# Patient Record
Sex: FEMALE | Race: WHITE | Employment: OTHER | URBAN - METROPOLITAN AREA
[De-identification: names, ages, dates, MRNs, and addresses within clinical notes are randomized per-mention and may not be internally consistent; named-entity substitution may affect disease eponyms.]

---

## 2017-01-01 ENCOUNTER — APPOINTMENT (OUTPATIENT)
Dept: GENERAL RADIOLOGY | Age: 82
DRG: 871 | End: 2017-01-01
Attending: INTERNAL MEDICINE
Payer: MEDICARE

## 2017-01-01 ENCOUNTER — HOME CARE VISIT (OUTPATIENT)
Dept: HOSPICE | Facility: HOSPICE | Age: 82
End: 2017-01-01
Payer: MEDICARE

## 2017-01-01 ENCOUNTER — HOSPITAL ENCOUNTER (OUTPATIENT)
Dept: INFUSION THERAPY | Age: 82
Discharge: HOME | End: 2017-02-09
Payer: MEDICARE

## 2017-01-01 ENCOUNTER — APPOINTMENT (OUTPATIENT)
Dept: ULTRASOUND IMAGING | Age: 82
DRG: 871 | End: 2017-01-01
Attending: INTERNAL MEDICINE
Payer: MEDICARE

## 2017-01-01 ENCOUNTER — HOSPITAL ENCOUNTER (OUTPATIENT)
Dept: LAB | Age: 82
Discharge: HOME | End: 2017-02-08

## 2017-01-01 ENCOUNTER — HOSPITAL ENCOUNTER (OUTPATIENT)
Dept: INFUSION THERAPY | Age: 82
Discharge: HOME | End: 2017-03-01
Payer: MEDICARE

## 2017-01-01 ENCOUNTER — HOSPITAL ENCOUNTER (INPATIENT)
Age: 82
LOS: 2 days | DRG: 835 | End: 2017-03-24
Attending: INTERNAL MEDICINE | Admitting: INTERNAL MEDICINE
Payer: OTHER MISCELLANEOUS

## 2017-01-01 ENCOUNTER — HOSPICE ADMISSION (OUTPATIENT)
Dept: HOSPICE | Facility: HOSPICE | Age: 82
End: 2017-01-01
Payer: MEDICARE

## 2017-01-01 ENCOUNTER — HOSPITAL ENCOUNTER (OUTPATIENT)
Dept: LAB | Age: 82
Discharge: HOME | End: 2017-03-13

## 2017-01-01 ENCOUNTER — APPOINTMENT (OUTPATIENT)
Dept: GENERAL RADIOLOGY | Age: 82
DRG: 871 | End: 2017-01-01
Attending: NURSE PRACTITIONER
Payer: MEDICARE

## 2017-01-01 ENCOUNTER — HOSPITAL ENCOUNTER (OUTPATIENT)
Dept: LAB | Age: 82
Discharge: HOME | End: 2017-03-07

## 2017-01-01 ENCOUNTER — HOSPITAL ENCOUNTER (INPATIENT)
Age: 82
LOS: 5 days | Discharge: HOSPICE | DRG: 871 | End: 2017-03-22
Attending: INTERNAL MEDICINE | Admitting: INTERNAL MEDICINE
Payer: MEDICARE

## 2017-01-01 ENCOUNTER — HOSPITAL ENCOUNTER (OUTPATIENT)
Dept: INFUSION THERAPY | Age: 82
Discharge: HOME | End: 2017-03-10
Payer: MEDICARE

## 2017-01-01 ENCOUNTER — HOSPITAL ENCOUNTER (OUTPATIENT)
Dept: INFUSION THERAPY | Age: 82
Discharge: HOME | End: 2017-03-16
Payer: MEDICARE

## 2017-01-01 ENCOUNTER — APPOINTMENT (OUTPATIENT)
Dept: CT IMAGING | Age: 82
DRG: 871 | End: 2017-01-01
Attending: NURSE PRACTITIONER
Payer: MEDICARE

## 2017-01-01 ENCOUNTER — HOSPITAL ENCOUNTER (OUTPATIENT)
Dept: LAB | Age: 82
Discharge: HOME | End: 2017-02-27

## 2017-01-01 VITALS
HEART RATE: 74 BPM | SYSTOLIC BLOOD PRESSURE: 152 MMHG | RESPIRATION RATE: 18 BRPM | TEMPERATURE: 97 F | DIASTOLIC BLOOD PRESSURE: 80 MMHG | OXYGEN SATURATION: 99 %

## 2017-01-01 VITALS
SYSTOLIC BLOOD PRESSURE: 132 MMHG | HEART RATE: 75 BPM | DIASTOLIC BLOOD PRESSURE: 68 MMHG | OXYGEN SATURATION: 98 % | TEMPERATURE: 98 F | RESPIRATION RATE: 16 BRPM

## 2017-01-01 VITALS
RESPIRATION RATE: 18 BRPM | TEMPERATURE: 98.2 F | HEART RATE: 73 BPM | SYSTOLIC BLOOD PRESSURE: 144 MMHG | DIASTOLIC BLOOD PRESSURE: 77 MMHG

## 2017-01-01 VITALS
TEMPERATURE: 99.2 F | DIASTOLIC BLOOD PRESSURE: 77 MMHG | RESPIRATION RATE: 18 BRPM | SYSTOLIC BLOOD PRESSURE: 166 MMHG | HEART RATE: 83 BPM

## 2017-01-01 VITALS
WEIGHT: 152.34 LBS | HEART RATE: 82 BPM | OXYGEN SATURATION: 95 % | DIASTOLIC BLOOD PRESSURE: 62 MMHG | TEMPERATURE: 97.1 F | BODY MASS INDEX: 26.99 KG/M2 | RESPIRATION RATE: 17 BRPM | SYSTOLIC BLOOD PRESSURE: 149 MMHG | HEIGHT: 63 IN

## 2017-01-01 VITALS
SYSTOLIC BLOOD PRESSURE: 89 MMHG | DIASTOLIC BLOOD PRESSURE: 49 MMHG | HEART RATE: 88 BPM | RESPIRATION RATE: 10 BRPM | OXYGEN SATURATION: 83 % | TEMPERATURE: 98 F

## 2017-01-01 LAB
ABO + RH BLD: NORMAL
ALBUMIN SERPL BCP-MCNC: 1.8 G/DL (ref 3.5–5)
ALBUMIN SERPL BCP-MCNC: 1.9 G/DL (ref 3.5–5)
ALBUMIN SERPL BCP-MCNC: 2.1 G/DL (ref 3.5–5)
ALBUMIN SERPL BCP-MCNC: 2.5 G/DL (ref 3.5–5)
ALBUMIN SERPL BCP-MCNC: 3 G/DL (ref 3.5–5)
ALBUMIN/GLOB SERPL: 0.5 {RATIO} (ref 1.1–2.2)
ALBUMIN/GLOB SERPL: 0.6 {RATIO} (ref 1.1–2.2)
ALBUMIN/GLOB SERPL: 0.7 {RATIO} (ref 1.1–2.2)
ALBUMIN/GLOB SERPL: 0.7 {RATIO} (ref 1.1–2.2)
ALBUMIN/GLOB SERPL: 0.8 {RATIO} (ref 1.1–2.2)
ALP SERPL-CCNC: 116 U/L (ref 45–117)
ALP SERPL-CCNC: 82 U/L (ref 45–117)
ALP SERPL-CCNC: 87 U/L (ref 45–117)
ALP SERPL-CCNC: 90 U/L (ref 45–117)
ALP SERPL-CCNC: 98 U/L (ref 45–117)
ALT SERPL-CCNC: 14 U/L (ref 12–78)
ALT SERPL-CCNC: 15 U/L (ref 12–78)
ALT SERPL-CCNC: 20 U/L (ref 12–78)
ALT SERPL-CCNC: 22 U/L (ref 12–78)
ALT SERPL-CCNC: 24 U/L (ref 12–78)
ANION GAP BLD CALC-SCNC: 8 MMOL/L (ref 5–15)
ANION GAP BLD CALC-SCNC: 9 MMOL/L (ref 5–15)
ANION GAP BLD CALC-SCNC: 9 MMOL/L (ref 5–15)
ANTIGENS PRESENT RBC DONR: NORMAL
APPEARANCE UR: ABNORMAL
APPEARANCE UR: CLEAR
APTT PPP: 27.2 SEC (ref 22.1–32.5)
AST SERPL W P-5'-P-CCNC: 17 U/L (ref 15–37)
AST SERPL W P-5'-P-CCNC: 19 U/L (ref 15–37)
AST SERPL W P-5'-P-CCNC: 21 U/L (ref 15–37)
AST SERPL W P-5'-P-CCNC: 7 U/L (ref 15–37)
AST SERPL W P-5'-P-CCNC: 9 U/L (ref 15–37)
ATRIAL RATE: 112 BPM
ATRIAL RATE: 120 BPM
ATRIAL RATE: 126 BPM
BACTERIA SPEC CULT: ABNORMAL
BACTERIA SPEC CULT: NORMAL
BACTERIA SPEC CULT: NORMAL
BACTERIA URNS QL MICRO: NEGATIVE /HPF
BACTERIA URNS QL MICRO: NEGATIVE /HPF
BASOPHILS # BLD AUTO: 0 K/UL (ref 0–0.1)
BASOPHILS # BLD: 0 % (ref 0–1)
BASOPHILS # BLD: 1 % (ref 0–1)
BILIRUB SERPL-MCNC: 2.5 MG/DL (ref 0.2–1)
BILIRUB SERPL-MCNC: 3.8 MG/DL (ref 0.2–1)
BILIRUB SERPL-MCNC: 4.3 MG/DL (ref 0.2–1)
BILIRUB SERPL-MCNC: 5.5 MG/DL (ref 0.2–1)
BILIRUB SERPL-MCNC: 5.9 MG/DL (ref 0.2–1)
BILIRUB UR QL CFM: POSITIVE
BILIRUB UR QL: NEGATIVE
BLD PROD TYP BPU: NORMAL
BLOOD BAG HEMOLYSIS,BLBAG: NORMAL
BLOOD BANK CMNT PATIENT-IMP: NORMAL
BLOOD GROUP ANTIBODIES SERPL: NORMAL
BPU ID: NORMAL
BUN SERPL-MCNC: 18 MG/DL (ref 6–20)
BUN SERPL-MCNC: 19 MG/DL (ref 6–20)
BUN SERPL-MCNC: 19 MG/DL (ref 6–20)
BUN SERPL-MCNC: 21 MG/DL (ref 6–20)
BUN SERPL-MCNC: 24 MG/DL (ref 6–20)
BUN SERPL-MCNC: 33 MG/DL (ref 6–20)
BUN SERPL-MCNC: 60 MG/DL (ref 6–20)
BUN/CREAT SERPL: 21 (ref 12–20)
BUN/CREAT SERPL: 25 (ref 12–20)
BUN/CREAT SERPL: 25 (ref 12–20)
BUN/CREAT SERPL: 31 (ref 12–20)
BUN/CREAT SERPL: 31 (ref 12–20)
BUN/CREAT SERPL: 38 (ref 12–20)
BUN/CREAT SERPL: 46 (ref 12–20)
CALCIUM SERPL-MCNC: 7.9 MG/DL (ref 8.5–10.1)
CALCIUM SERPL-MCNC: 7.9 MG/DL (ref 8.5–10.1)
CALCIUM SERPL-MCNC: 8.1 MG/DL (ref 8.5–10.1)
CALCIUM SERPL-MCNC: 8.2 MG/DL (ref 8.5–10.1)
CALCIUM SERPL-MCNC: 8.3 MG/DL (ref 8.5–10.1)
CALCIUM SERPL-MCNC: 8.3 MG/DL (ref 8.5–10.1)
CALCIUM SERPL-MCNC: 8.4 MG/DL (ref 8.5–10.1)
CALCULATED P AXIS, ECG09: 65 DEGREES
CALCULATED P AXIS, ECG09: 71 DEGREES
CALCULATED R AXIS, ECG10: -29 DEGREES
CALCULATED R AXIS, ECG10: -34 DEGREES
CALCULATED R AXIS, ECG10: -35 DEGREES
CALCULATED T AXIS, ECG11: 53 DEGREES
CALCULATED T AXIS, ECG11: 61 DEGREES
CALCULATED T AXIS, ECG11: 69 DEGREES
CC UR VC: NORMAL
CHLORIDE SERPL-SCNC: 104 MMOL/L (ref 97–108)
CHLORIDE SERPL-SCNC: 105 MMOL/L (ref 97–108)
CHLORIDE SERPL-SCNC: 109 MMOL/L (ref 97–108)
CHLORIDE SERPL-SCNC: 110 MMOL/L (ref 97–108)
CHLORIDE SERPL-SCNC: 110 MMOL/L (ref 97–108)
CHLORIDE SERPL-SCNC: 111 MMOL/L (ref 97–108)
CHLORIDE SERPL-SCNC: 98 MMOL/L (ref 97–108)
CLERICAL ERRORS,CLERR: NORMAL
CO2 SERPL-SCNC: 21 MMOL/L (ref 21–32)
CO2 SERPL-SCNC: 22 MMOL/L (ref 21–32)
CO2 SERPL-SCNC: 22 MMOL/L (ref 21–32)
CO2 SERPL-SCNC: 23 MMOL/L (ref 21–32)
CO2 SERPL-SCNC: 24 MMOL/L (ref 21–32)
CO2 SERPL-SCNC: 25 MMOL/L (ref 21–32)
CO2 SERPL-SCNC: 26 MMOL/L (ref 21–32)
COLOR UR: ABNORMAL
COLOR UR: ABNORMAL
CREAT SERPL-MCNC: 0.61 MG/DL (ref 0.55–1.02)
CREAT SERPL-MCNC: 0.64 MG/DL (ref 0.55–1.02)
CREAT SERPL-MCNC: 0.67 MG/DL (ref 0.55–1.02)
CREAT SERPL-MCNC: 0.72 MG/DL (ref 0.55–1.02)
CREAT SERPL-MCNC: 0.76 MG/DL (ref 0.55–1.02)
CREAT SERPL-MCNC: 0.86 MG/DL (ref 0.55–1.02)
CREAT SERPL-MCNC: 2.36 MG/DL (ref 0.55–1.02)
CROSSMATCH RESULT,%XM: NORMAL
DAT P TRANSF RBC QL: NORMAL
DAT RBC QL: NORMAL
DATE LAST DOSE: NORMAL
DIAGNOSIS, 93000: NORMAL
DIFFERENTIAL METHOD BLD: ABNORMAL
EOSINOPHIL # BLD: 0 K/UL (ref 0–0.4)
EOSINOPHIL NFR BLD: 0 % (ref 0–7)
EPITH CASTS URNS QL MICRO: ABNORMAL /LPF
EPITH CASTS URNS QL MICRO: ABNORMAL /LPF
ERYTHROCYTE [DISTWIDTH] IN BLOOD BY AUTOMATED COUNT: 14.8 % (ref 11.5–14.5)
ERYTHROCYTE [DISTWIDTH] IN BLOOD BY AUTOMATED COUNT: 14.9 % (ref 11.5–14.5)
ERYTHROCYTE [DISTWIDTH] IN BLOOD BY AUTOMATED COUNT: 15 % (ref 11.5–14.5)
ERYTHROCYTE [DISTWIDTH] IN BLOOD BY AUTOMATED COUNT: 15.3 % (ref 11.5–14.5)
ERYTHROCYTE [DISTWIDTH] IN BLOOD BY AUTOMATED COUNT: 16.1 % (ref 11.5–14.5)
ERYTHROCYTE [DISTWIDTH] IN BLOOD BY AUTOMATED COUNT: 16.4 % (ref 11.5–14.5)
GLOBULIN SER CALC-MCNC: 2.9 G/DL (ref 2–4)
GLOBULIN SER CALC-MCNC: 3.6 G/DL (ref 2–4)
GLOBULIN SER CALC-MCNC: 3.8 G/DL (ref 2–4)
GLUCOSE BLD STRIP.AUTO-MCNC: 103 MG/DL (ref 65–100)
GLUCOSE SERPL-MCNC: 120 MG/DL (ref 65–100)
GLUCOSE SERPL-MCNC: 132 MG/DL (ref 65–100)
GLUCOSE SERPL-MCNC: 144 MG/DL (ref 65–100)
GLUCOSE SERPL-MCNC: 95 MG/DL (ref 65–100)
GLUCOSE SERPL-MCNC: 96 MG/DL (ref 65–100)
GLUCOSE SERPL-MCNC: 97 MG/DL (ref 65–100)
GLUCOSE SERPL-MCNC: 98 MG/DL (ref 65–100)
GLUCOSE UR STRIP.AUTO-MCNC: NEGATIVE MG/DL
GLUCOSE UR STRIP.AUTO-MCNC: NEGATIVE MG/DL
HCT VFR BLD AUTO: 17.2 % (ref 35–47)
HCT VFR BLD AUTO: 18.7 % (ref 35–47)
HCT VFR BLD AUTO: 19.2 % (ref 35–47)
HCT VFR BLD AUTO: 19.5 % (ref 35–47)
HCT VFR BLD AUTO: 21 % (ref 35–47)
HCT VFR BLD AUTO: 22.4 % (ref 35–47)
HCT VFR BLD AUTO: 22.7 % (ref 35–47)
HCT VFR BLD AUTO: 24.7 % (ref 35–47)
HCT VFR BLD AUTO: 25.2 % (ref 35–47)
HEMOCCULT STL QL: POSITIVE
HEMOLYSIS, POST TXN,PSTHE: NORMAL
HEMOLYSIS,PRE TXN,PREHE: SLIGHT
HGB BLD-MCNC: 5.9 G/DL (ref 11.5–16)
HGB BLD-MCNC: 6.4 G/DL (ref 11.5–16)
HGB BLD-MCNC: 6.6 G/DL (ref 11.5–16)
HGB BLD-MCNC: 6.7 G/DL (ref 11.5–16)
HGB BLD-MCNC: 7.3 G/DL (ref 11.5–16)
HGB BLD-MCNC: 7.9 G/DL (ref 11.5–16)
HGB BLD-MCNC: 7.9 G/DL (ref 11.5–16)
HGB BLD-MCNC: 8.7 G/DL (ref 11.5–16)
HGB BLD-MCNC: 9 G/DL (ref 11.5–16)
HGB UR QL STRIP: ABNORMAL
HGB UR QL STRIP: ABNORMAL
INR PPP: 1.1 (ref 0.9–1.1)
INR PPP: 1.4 (ref 0.9–1.1)
KETONES UR QL STRIP.AUTO: ABNORMAL MG/DL
KETONES UR QL STRIP.AUTO: NEGATIVE MG/DL
LACTATE SERPL-SCNC: 0.9 MMOL/L (ref 0.4–2)
LACTATE SERPL-SCNC: 1.4 MMOL/L (ref 0.4–2)
LEUKOCYTE ESTERASE UR QL STRIP.AUTO: ABNORMAL
LEUKOCYTE ESTERASE UR QL STRIP.AUTO: NEGATIVE
LYMPHOCYTES # BLD AUTO: 65 % (ref 12–49)
LYMPHOCYTES # BLD AUTO: 73 % (ref 12–49)
LYMPHOCYTES # BLD AUTO: 90 % (ref 12–49)
LYMPHOCYTES # BLD AUTO: 90 % (ref 12–49)
LYMPHOCYTES # BLD: 0.1 K/UL (ref 0.8–3.5)
LYMPHOCYTES # BLD: 0.5 K/UL (ref 0.8–3.5)
MAGNESIUM SERPL-MCNC: 1.9 MG/DL (ref 1.6–2.4)
MAGNESIUM SERPL-MCNC: 1.9 MG/DL (ref 1.6–2.4)
MCH RBC QN AUTO: 29 PG (ref 26–34)
MCH RBC QN AUTO: 29.1 PG (ref 26–34)
MCH RBC QN AUTO: 29.1 PG (ref 26–34)
MCH RBC QN AUTO: 29.2 PG (ref 26–34)
MCH RBC QN AUTO: 29.2 PG (ref 26–34)
MCH RBC QN AUTO: 29.4 PG (ref 26–34)
MCH RBC QN AUTO: 29.4 PG (ref 26–34)
MCH RBC QN AUTO: 29.5 PG (ref 26–34)
MCH RBC QN AUTO: 30.3 PG (ref 26–34)
MCHC RBC AUTO-ENTMCNC: 33.3 G/DL (ref 30–36.5)
MCHC RBC AUTO-ENTMCNC: 34.3 G/DL (ref 30–36.5)
MCHC RBC AUTO-ENTMCNC: 34.4 G/DL (ref 30–36.5)
MCHC RBC AUTO-ENTMCNC: 34.8 G/DL (ref 30–36.5)
MCHC RBC AUTO-ENTMCNC: 34.8 G/DL (ref 30–36.5)
MCHC RBC AUTO-ENTMCNC: 35.2 G/DL (ref 30–36.5)
MCHC RBC AUTO-ENTMCNC: 35.3 G/DL (ref 30–36.5)
MCHC RBC AUTO-ENTMCNC: 35.3 G/DL (ref 30–36.5)
MCHC RBC AUTO-ENTMCNC: 35.7 G/DL (ref 30–36.5)
MCV RBC AUTO: 82.6 FL (ref 80–99)
MCV RBC AUTO: 82.6 FL (ref 80–99)
MCV RBC AUTO: 82.7 FL (ref 80–99)
MCV RBC AUTO: 83.3 FL (ref 80–99)
MCV RBC AUTO: 83.3 FL (ref 80–99)
MCV RBC AUTO: 83.8 FL (ref 80–99)
MCV RBC AUTO: 85.5 FL (ref 80–99)
MCV RBC AUTO: 87.3 FL (ref 80–99)
MCV RBC AUTO: 88.2 FL (ref 80–99)
MD INTERPRETATION: NORMAL
MONOCYTES # BLD: 0 K/UL (ref 0–1)
MONOCYTES NFR BLD AUTO: 1 % (ref 5–13)
MONOCYTES NFR BLD AUTO: 2 % (ref 5–13)
MONOCYTES NFR BLD AUTO: 3 % (ref 5–13)
MONOCYTES NFR BLD AUTO: 5 % (ref 5–13)
MUCOUS THREADS URNS QL MICRO: ABNORMAL /LPF
NEUTS BAND NFR BLD MANUAL: 1 % (ref 0–6)
NEUTS BAND NFR BLD MANUAL: 1 % (ref 0–6)
NEUTS BAND NFR BLD MANUAL: 2 % (ref 0–6)
NEUTS SEG # BLD: 0 K/UL (ref 1.8–8)
NEUTS SEG # BLD: 0.2 K/UL (ref 1.8–8)
NEUTS SEG NFR BLD AUTO: 20 % (ref 32–75)
NEUTS SEG NFR BLD AUTO: 33 % (ref 32–75)
NEUTS SEG NFR BLD AUTO: 6 % (ref 32–75)
NEUTS SEG NFR BLD AUTO: 7 % (ref 32–75)
NITRITE UR QL STRIP.AUTO: NEGATIVE
NITRITE UR QL STRIP.AUTO: POSITIVE
P-R INTERVAL, ECG05: 150 MS
P-R INTERVAL, ECG05: 160 MS
PATH REV BLD -IMP: ABNORMAL
PATH REV BLD -IMP: NORMAL
PH UR STRIP: 6 [PH] (ref 5–8)
PH UR STRIP: 6.5 [PH] (ref 5–8)
PHOSPHATE SERPL-MCNC: 1.6 MG/DL (ref 2.6–4.7)
PHOSPHATE SERPL-MCNC: 2.4 MG/DL (ref 2.6–4.7)
PLATELET # BLD AUTO: 10 K/UL (ref 150–400)
PLATELET # BLD AUTO: 10 K/UL (ref 150–400)
PLATELET # BLD AUTO: 11 K/UL (ref 150–400)
PLATELET # BLD AUTO: 12 K/UL (ref 150–400)
PLATELET # BLD AUTO: 5 K/UL (ref 150–400)
PLATELET # BLD AUTO: 6 K/UL (ref 150–400)
PLATELET # BLD AUTO: 7 K/UL (ref 150–400)
PLATELET # BLD AUTO: 9 K/UL (ref 150–400)
PLATELET # BLD AUTO: 9 K/UL (ref 150–400)
POTASSIUM SERPL-SCNC: 3 MMOL/L (ref 3.5–5.1)
POTASSIUM SERPL-SCNC: 3.1 MMOL/L (ref 3.5–5.1)
POTASSIUM SERPL-SCNC: 3.3 MMOL/L (ref 3.5–5.1)
POTASSIUM SERPL-SCNC: 3.6 MMOL/L (ref 3.5–5.1)
POTASSIUM SERPL-SCNC: 3.8 MMOL/L (ref 3.5–5.1)
POTASSIUM SERPL-SCNC: 4 MMOL/L (ref 3.5–5.1)
POTASSIUM SERPL-SCNC: 4.4 MMOL/L (ref 3.5–5.1)
PROT SERPL-MCNC: 4.8 G/DL (ref 6.4–8.2)
PROT SERPL-MCNC: 5.4 G/DL (ref 6.4–8.2)
PROT SERPL-MCNC: 5.7 G/DL (ref 6.4–8.2)
PROT SERPL-MCNC: 6.1 G/DL (ref 6.4–8.2)
PROT SERPL-MCNC: 6.8 G/DL (ref 6.4–8.2)
PROT UR STRIP-MCNC: 30 MG/DL
PROT UR STRIP-MCNC: NEGATIVE MG/DL
PROTHROMBIN TIME: 11.7 SEC (ref 9–11.1)
PROTHROMBIN TIME: 14.4 SEC (ref 9–11.1)
Q-T INTERVAL, ECG07: 254 MS
Q-T INTERVAL, ECG07: 306 MS
Q-T INTERVAL, ECG07: 320 MS
QRS DURATION, ECG06: 100 MS
QRS DURATION, ECG06: 84 MS
QRS DURATION, ECG06: 94 MS
QTC CALCULATION (BEZET), ECG08: 432 MS
QTC CALCULATION (BEZET), ECG08: 434 MS
QTC CALCULATION (BEZET), ECG08: 436 MS
RBC # BLD AUTO: 1.95 M/UL (ref 3.8–5.2)
RBC # BLD AUTO: 2.2 M/UL (ref 3.8–5.2)
RBC # BLD AUTO: 2.26 M/UL (ref 3.8–5.2)
RBC # BLD AUTO: 2.28 M/UL (ref 3.8–5.2)
RBC # BLD AUTO: 2.52 M/UL (ref 3.8–5.2)
RBC # BLD AUTO: 2.69 M/UL (ref 3.8–5.2)
RBC # BLD AUTO: 2.71 M/UL (ref 3.8–5.2)
RBC # BLD AUTO: 2.99 M/UL (ref 3.8–5.2)
RBC # BLD AUTO: 3.05 M/UL (ref 3.8–5.2)
RBC #/AREA URNS HPF: ABNORMAL /HPF (ref 0–5)
RBC #/AREA URNS HPF: ABNORMAL /HPF (ref 0–5)
RBC MORPH BLD: ABNORMAL
REPORTED DOSE,DOSE: NORMAL UNITS
REPORTED DOSE/TIME,TMG: NORMAL
SERVICE CMNT-IMP: ABNORMAL
SERVICE CMNT-IMP: ABNORMAL
SERVICE CMNT-IMP: NORMAL
SERVICE CMNT-IMP: NORMAL
SODIUM SERPL-SCNC: 132 MMOL/L (ref 136–145)
SODIUM SERPL-SCNC: 137 MMOL/L (ref 136–145)
SODIUM SERPL-SCNC: 137 MMOL/L (ref 136–145)
SODIUM SERPL-SCNC: 139 MMOL/L (ref 136–145)
SODIUM SERPL-SCNC: 140 MMOL/L (ref 136–145)
SODIUM SERPL-SCNC: 141 MMOL/L (ref 136–145)
SODIUM SERPL-SCNC: 142 MMOL/L (ref 136–145)
SP GR UR REFRACTOMETRY: 1.01 (ref 1–1.03)
SP GR UR REFRACTOMETRY: 1.02 (ref 1–1.03)
SPECIMEN EXP DATE BLD: NORMAL
STATUS OF UNIT,%ST: NORMAL
THERAPEUTIC RANGE,PTTT: NORMAL SECS (ref 58–77)
TROPONIN I SERPL-MCNC: <0.04 NG/ML
UA: UC IF INDICATED,UAUC: ABNORMAL
UNIT DIVISION, %UDIV: 0
UNIT NUMBER,UN: NORMAL
UROBILINOGEN UR QL STRIP.AUTO: 1 EU/DL (ref 0.2–1)
UROBILINOGEN UR QL STRIP.AUTO: 4 EU/DL (ref 0.2–1)
VANCOMYCIN TROUGH SERPL-MCNC: 6.4 UG/ML (ref 5–10)
VENTRICULAR RATE, ECG03: 112 BPM
VENTRICULAR RATE, ECG03: 120 BPM
VENTRICULAR RATE, ECG03: 176 BPM
WBC # BLD AUTO: 0.1 K/UL (ref 3.6–11)
WBC # BLD AUTO: 0.2 K/UL (ref 3.6–11)
WBC # BLD AUTO: 0.2 K/UL (ref 3.6–11)
WBC # BLD AUTO: 0.7 K/UL (ref 3.6–11)
WBC MORPH BLD: ABNORMAL
WBC URNS QL MICRO: ABNORMAL /HPF (ref 0–4)
WBC URNS QL MICRO: ABNORMAL /HPF (ref 0–4)
YEAST BUDDING URNS QL: PRESENT

## 2017-01-01 PROCEDURE — 74011000250 HC RX REV CODE- 250: Performed by: INTERNAL MEDICINE

## 2017-01-01 PROCEDURE — 85025 COMPLETE CBC W/AUTO DIFF WBC: CPT | Performed by: NURSE PRACTITIONER

## 2017-01-01 PROCEDURE — 74011250637 HC RX REV CODE- 250/637: Performed by: INTERNAL MEDICINE

## 2017-01-01 PROCEDURE — 0651 HSPC ROUTINE HOME CARE

## 2017-01-01 PROCEDURE — P9016 RBC LEUKOCYTES REDUCED: HCPCS | Performed by: INTERNAL MEDICINE

## 2017-01-01 PROCEDURE — 77010033678 HC OXYGEN DAILY

## 2017-01-01 PROCEDURE — 65270000029 HC RM PRIVATE

## 2017-01-01 PROCEDURE — 65660000000 HC RM CCU STEPDOWN

## 2017-01-01 PROCEDURE — 86870 RBC ANTIBODY IDENTIFICATION: CPT | Performed by: INTERNAL MEDICINE

## 2017-01-01 PROCEDURE — 74011250636 HC RX REV CODE- 250/636: Performed by: INTERNAL MEDICINE

## 2017-01-01 PROCEDURE — 84484 ASSAY OF TROPONIN QUANT: CPT | Performed by: INTERNAL MEDICINE

## 2017-01-01 PROCEDURE — G0299 HHS/HOSPICE OF RN EA 15 MIN: HCPCS

## 2017-01-01 PROCEDURE — 86902 BLOOD TYPE ANTIGEN DONOR EA: CPT | Performed by: INTERNAL MEDICINE

## 2017-01-01 PROCEDURE — 86900 BLOOD TYPING SEROLOGIC ABO: CPT | Performed by: INTERNAL MEDICINE

## 2017-01-01 PROCEDURE — P9040 RBC LEUKOREDUCED IRRADIATED: HCPCS | Performed by: INTERNAL MEDICINE

## 2017-01-01 PROCEDURE — 36415 COLL VENOUS BLD VENIPUNCTURE: CPT | Performed by: INTERNAL MEDICINE

## 2017-01-01 PROCEDURE — 74011250637 HC RX REV CODE- 250/637

## 2017-01-01 PROCEDURE — 93005 ELECTROCARDIOGRAM TRACING: CPT

## 2017-01-01 PROCEDURE — 81001 URINALYSIS AUTO W/SCOPE: CPT | Performed by: INTERNAL MEDICINE

## 2017-01-01 PROCEDURE — 83605 ASSAY OF LACTIC ACID: CPT | Performed by: INTERNAL MEDICINE

## 2017-01-01 PROCEDURE — 80048 BASIC METABOLIC PNL TOTAL CA: CPT | Performed by: INTERNAL MEDICINE

## 2017-01-01 PROCEDURE — 86901 BLOOD TYPING SEROLOGIC RH(D): CPT | Performed by: INTERNAL MEDICINE

## 2017-01-01 PROCEDURE — 77030013169 SET IV BLD ICUM -A

## 2017-01-01 PROCEDURE — 74011000258 HC RX REV CODE- 258: Performed by: INTERNAL MEDICINE

## 2017-01-01 PROCEDURE — 82272 OCCULT BLD FECES 1-3 TESTS: CPT | Performed by: NURSE PRACTITIONER

## 2017-01-01 PROCEDURE — 86920 COMPATIBILITY TEST SPIN: CPT | Performed by: INTERNAL MEDICINE

## 2017-01-01 PROCEDURE — 86922 COMPATIBILITY TEST ANTIGLOB: CPT | Performed by: INTERNAL MEDICINE

## 2017-01-01 PROCEDURE — 85027 COMPLETE CBC AUTOMATED: CPT | Performed by: INTERNAL MEDICINE

## 2017-01-01 PROCEDURE — 81001 URINALYSIS AUTO W/SCOPE: CPT | Performed by: NURSE PRACTITIONER

## 2017-01-01 PROCEDURE — 83735 ASSAY OF MAGNESIUM: CPT | Performed by: NURSE PRACTITIONER

## 2017-01-01 PROCEDURE — 71010 XR CHEST PORT: CPT

## 2017-01-01 PROCEDURE — 80053 COMPREHEN METABOLIC PANEL: CPT | Performed by: INTERNAL MEDICINE

## 2017-01-01 PROCEDURE — 36430 TRANSFUSION BLD/BLD COMPNT: CPT

## 2017-01-01 PROCEDURE — 86921 COMPATIBILITY TEST INCUBATE: CPT | Performed by: INTERNAL MEDICINE

## 2017-01-01 PROCEDURE — 85610 PROTHROMBIN TIME: CPT | Performed by: INTERNAL MEDICINE

## 2017-01-01 PROCEDURE — 74176 CT ABD & PELVIS W/O CONTRAST: CPT

## 2017-01-01 PROCEDURE — 85730 THROMBOPLASTIN TIME PARTIAL: CPT | Performed by: INTERNAL MEDICINE

## 2017-01-01 PROCEDURE — 85025 COMPLETE CBC W/AUTO DIFF WBC: CPT | Performed by: INTERNAL MEDICINE

## 2017-01-01 PROCEDURE — 51798 US URINE CAPACITY MEASURE: CPT

## 2017-01-01 PROCEDURE — 85610 PROTHROMBIN TIME: CPT | Performed by: NURSE PRACTITIONER

## 2017-01-01 PROCEDURE — 77030012965 HC NDL HUBR BBMI -A

## 2017-01-01 PROCEDURE — 80053 COMPREHEN METABOLIC PANEL: CPT | Performed by: NURSE PRACTITIONER

## 2017-01-01 PROCEDURE — 3336500001 HSPC ELECTION

## 2017-01-01 PROCEDURE — 87040 BLOOD CULTURE FOR BACTERIA: CPT | Performed by: NURSE PRACTITIONER

## 2017-01-01 PROCEDURE — P9037 PLATE PHERES LEUKOREDU IRRAD: HCPCS | Performed by: INTERNAL MEDICINE

## 2017-01-01 PROCEDURE — 83605 ASSAY OF LACTIC ACID: CPT | Performed by: NURSE PRACTITIONER

## 2017-01-01 PROCEDURE — 86850 RBC ANTIBODY SCREEN: CPT | Performed by: INTERNAL MEDICINE

## 2017-01-01 PROCEDURE — 77030034850

## 2017-01-01 PROCEDURE — 0656 HSPC GENERAL INPATIENT

## 2017-01-01 PROCEDURE — 83735 ASSAY OF MAGNESIUM: CPT | Performed by: INTERNAL MEDICINE

## 2017-01-01 PROCEDURE — 86078 PHYS BLOOD BANK SERV REACTJ: CPT | Performed by: INTERNAL MEDICINE

## 2017-01-01 PROCEDURE — 87040 BLOOD CULTURE FOR BACTERIA: CPT | Performed by: INTERNAL MEDICINE

## 2017-01-01 PROCEDURE — 82962 GLUCOSE BLOOD TEST: CPT

## 2017-01-01 PROCEDURE — 80202 ASSAY OF VANCOMYCIN: CPT | Performed by: INTERNAL MEDICINE

## 2017-01-01 PROCEDURE — 74011250636 HC RX REV CODE- 250/636: Performed by: NURSE PRACTITIONER

## 2017-01-01 PROCEDURE — 74011250636 HC RX REV CODE- 250/636

## 2017-01-01 PROCEDURE — 76705 ECHO EXAM OF ABDOMEN: CPT

## 2017-01-01 PROCEDURE — 84100 ASSAY OF PHOSPHORUS: CPT | Performed by: INTERNAL MEDICINE

## 2017-01-01 PROCEDURE — 93041 RHYTHM ECG TRACING: CPT

## 2017-01-01 PROCEDURE — 77030029131 HC ADMN ST IV BLD N DEHP ICUM -B

## 2017-01-01 PROCEDURE — 86644 CMV ANTIBODY: CPT | Performed by: INTERNAL MEDICINE

## 2017-01-01 PROCEDURE — 87086 URINE CULTURE/COLONY COUNT: CPT | Performed by: INTERNAL MEDICINE

## 2017-01-01 PROCEDURE — 84100 ASSAY OF PHOSPHORUS: CPT | Performed by: NURSE PRACTITIONER

## 2017-01-01 RX ORDER — POTASSIUM CHLORIDE 750 MG/1
30 TABLET, FILM COATED, EXTENDED RELEASE ORAL
Status: COMPLETED | OUTPATIENT
Start: 2017-01-01 | End: 2017-01-01

## 2017-01-01 RX ORDER — SODIUM CHLORIDE 9 MG/ML
250 INJECTION, SOLUTION INTRAVENOUS AS NEEDED
Status: DISPENSED | OUTPATIENT
Start: 2017-01-01 | End: 2017-01-01

## 2017-01-01 RX ORDER — ADENOSINE 3 MG/ML
INJECTION, SOLUTION INTRAVENOUS
Status: DISPENSED
Start: 2017-01-01 | End: 2017-01-01

## 2017-01-01 RX ORDER — ACETAMINOPHEN 325 MG/1
650 TABLET ORAL
Status: ACTIVE | OUTPATIENT
Start: 2017-01-01 | End: 2017-01-01

## 2017-01-01 RX ORDER — MORPHINE SULFATE 2 MG/ML
2 INJECTION, SOLUTION INTRAMUSCULAR; INTRAVENOUS
Status: DISCONTINUED | OUTPATIENT
Start: 2017-01-01 | End: 2017-01-01 | Stop reason: HOSPADM

## 2017-01-01 RX ORDER — MORPHINE SULFATE 5 MG/ML
INJECTION, SOLUTION INTRAVENOUS
Status: DISCONTINUED | OUTPATIENT
Start: 2017-01-01 | End: 2017-01-01 | Stop reason: HOSPADM

## 2017-01-01 RX ORDER — SODIUM CHLORIDE 9 MG/ML
250 INJECTION, SOLUTION INTRAVENOUS AS NEEDED
Status: DISCONTINUED | OUTPATIENT
Start: 2017-01-01 | End: 2017-01-01 | Stop reason: HOSPADM

## 2017-01-01 RX ORDER — FUROSEMIDE 10 MG/ML
20 INJECTION INTRAMUSCULAR; INTRAVENOUS ONCE
Status: COMPLETED | OUTPATIENT
Start: 2017-01-01 | End: 2017-01-01

## 2017-01-01 RX ORDER — SODIUM CHLORIDE 9 MG/ML
125 INJECTION, SOLUTION INTRAVENOUS CONTINUOUS
Status: DISCONTINUED | OUTPATIENT
Start: 2017-01-01 | End: 2017-01-01

## 2017-01-01 RX ORDER — LORAZEPAM 2 MG/ML
1 INJECTION INTRAMUSCULAR
Status: DISCONTINUED | OUTPATIENT
Start: 2017-01-01 | End: 2017-01-01 | Stop reason: HOSPADM

## 2017-01-01 RX ORDER — DIPHENHYDRAMINE HCL 25 MG
25 CAPSULE ORAL
Status: ACTIVE | OUTPATIENT
Start: 2017-01-01 | End: 2017-01-01

## 2017-01-01 RX ORDER — DIPHENHYDRAMINE HCL 25 MG
25 CAPSULE ORAL
Status: DISPENSED | OUTPATIENT
Start: 2017-01-01 | End: 2017-01-01

## 2017-01-01 RX ORDER — GLYCOPYRROLATE 0.2 MG/ML
0.2 INJECTION INTRAMUSCULAR; INTRAVENOUS
Status: DISCONTINUED | OUTPATIENT
Start: 2017-01-01 | End: 2017-01-01 | Stop reason: HOSPADM

## 2017-01-01 RX ORDER — HEPARIN 100 UNIT/ML
500 SYRINGE INTRAVENOUS AS NEEDED
Status: ACTIVE | OUTPATIENT
Start: 2017-01-01 | End: 2017-01-01

## 2017-01-01 RX ORDER — SODIUM CHLORIDE 9 MG/ML
25 INJECTION, SOLUTION INTRAVENOUS CONTINUOUS
Status: DISPENSED | OUTPATIENT
Start: 2017-01-01 | End: 2017-01-01

## 2017-01-01 RX ORDER — SODIUM CHLORIDE 0.9 % (FLUSH) 0.9 %
5-10 SYRINGE (ML) INJECTION AS NEEDED
Status: DISCONTINUED | OUTPATIENT
Start: 2017-01-01 | End: 2017-01-01 | Stop reason: HOSPADM

## 2017-01-01 RX ORDER — ADENOSINE 3 MG/ML
6 INJECTION, SOLUTION INTRAVENOUS ONCE
Status: DISCONTINUED | OUTPATIENT
Start: 2017-01-01 | End: 2017-01-01 | Stop reason: SDUPTHER

## 2017-01-01 RX ORDER — DIPHENHYDRAMINE HCL 25 MG
25 CAPSULE ORAL ONCE
Status: COMPLETED | OUTPATIENT
Start: 2017-01-01 | End: 2017-01-01

## 2017-01-01 RX ORDER — DIPHENHYDRAMINE HCL 25 MG
25 CAPSULE ORAL AS NEEDED
Status: DISCONTINUED | OUTPATIENT
Start: 2017-01-01 | End: 2017-01-01 | Stop reason: HOSPADM

## 2017-01-01 RX ORDER — ACETAMINOPHEN 650 MG/1
650 SUPPOSITORY RECTAL
Status: DISCONTINUED | OUTPATIENT
Start: 2017-01-01 | End: 2017-01-01 | Stop reason: HOSPADM

## 2017-01-01 RX ORDER — ACETAMINOPHEN 10 MG/ML
1000 INJECTION, SOLUTION INTRAVENOUS ONCE
Status: COMPLETED | OUTPATIENT
Start: 2017-01-01 | End: 2017-01-01

## 2017-01-01 RX ORDER — OXYCODONE HYDROCHLORIDE 5 MG/1
5 TABLET ORAL
Status: DISCONTINUED | OUTPATIENT
Start: 2017-01-01 | End: 2017-01-01 | Stop reason: HOSPADM

## 2017-01-01 RX ORDER — ALLOPURINOL 100 MG/1
TABLET ORAL DAILY
COMMUNITY

## 2017-01-01 RX ORDER — LEVOFLOXACIN 5 MG/ML
750 INJECTION, SOLUTION INTRAVENOUS EVERY 24 HOURS
Status: DISCONTINUED | OUTPATIENT
Start: 2017-01-01 | End: 2017-01-01 | Stop reason: HOSPADM

## 2017-01-01 RX ORDER — POLYETHYLENE GLYCOL 3350 17 G/17G
17 POWDER, FOR SOLUTION ORAL DAILY
Status: DISCONTINUED | OUTPATIENT
Start: 2017-01-01 | End: 2017-01-01 | Stop reason: HOSPADM

## 2017-01-01 RX ORDER — FUROSEMIDE 10 MG/ML
INJECTION INTRAMUSCULAR; INTRAVENOUS
Status: DISPENSED
Start: 2017-01-01 | End: 2017-01-01

## 2017-01-01 RX ORDER — FLUCONAZOLE 2 MG/ML
200 INJECTION, SOLUTION INTRAVENOUS EVERY 24 HOURS
Status: DISCONTINUED | OUTPATIENT
Start: 2017-01-01 | End: 2017-01-01 | Stop reason: HOSPADM

## 2017-01-01 RX ORDER — LORAZEPAM 1 MG/1
1 TABLET ORAL
Status: DISCONTINUED | OUTPATIENT
Start: 2017-01-01 | End: 2017-01-01 | Stop reason: HOSPADM

## 2017-01-01 RX ORDER — DIPHENHYDRAMINE HYDROCHLORIDE 50 MG/ML
25 INJECTION, SOLUTION INTRAMUSCULAR; INTRAVENOUS AS NEEDED
Status: DISCONTINUED | OUTPATIENT
Start: 2017-01-01 | End: 2017-01-01

## 2017-01-01 RX ORDER — SODIUM CHLORIDE 9 MG/ML
10 INJECTION INTRAMUSCULAR; INTRAVENOUS; SUBCUTANEOUS AS NEEDED
Status: ACTIVE | OUTPATIENT
Start: 2017-01-01 | End: 2017-01-01

## 2017-01-01 RX ORDER — ACETAMINOPHEN 325 MG/1
650 TABLET ORAL
Status: DISCONTINUED | OUTPATIENT
Start: 2017-01-01 | End: 2017-01-01 | Stop reason: HOSPADM

## 2017-01-01 RX ORDER — DILTIAZEM HYDROCHLORIDE 5 MG/ML
5 INJECTION INTRAVENOUS ONCE
Status: COMPLETED | OUTPATIENT
Start: 2017-01-01 | End: 2017-01-01

## 2017-01-01 RX ORDER — DIGOXIN 0.25 MG/ML
250 INJECTION INTRAMUSCULAR; INTRAVENOUS EVERY 6 HOURS
Status: COMPLETED | OUTPATIENT
Start: 2017-01-01 | End: 2017-01-01

## 2017-01-01 RX ORDER — POTASSIUM CHLORIDE 750 MG/1
20 TABLET, FILM COATED, EXTENDED RELEASE ORAL 2 TIMES DAILY
Status: COMPLETED | OUTPATIENT
Start: 2017-01-01 | End: 2017-01-01

## 2017-01-01 RX ORDER — SODIUM CHLORIDE 0.9 % (FLUSH) 0.9 %
10-40 SYRINGE (ML) INJECTION AS NEEDED
Status: DISCONTINUED | OUTPATIENT
Start: 2017-01-01 | End: 2017-01-01 | Stop reason: HOSPADM

## 2017-01-01 RX ORDER — VANCOMYCIN/0.9 % SOD CHLORIDE 1.5G/250ML
1500 PLASTIC BAG, INJECTION (ML) INTRAVENOUS ONCE
Status: COMPLETED | OUTPATIENT
Start: 2017-01-01 | End: 2017-01-01

## 2017-01-01 RX ORDER — MORPHINE SULFATE 2 MG/ML
INJECTION, SOLUTION INTRAMUSCULAR; INTRAVENOUS
Status: COMPLETED
Start: 2017-01-01 | End: 2017-01-01

## 2017-01-01 RX ORDER — ACETAMINOPHEN 325 MG/1
650 TABLET ORAL ONCE
Status: COMPLETED | OUTPATIENT
Start: 2017-01-01 | End: 2017-01-01

## 2017-01-01 RX ORDER — SODIUM CHLORIDE 0.9 % (FLUSH) 0.9 %
5-10 SYRINGE (ML) INJECTION AS NEEDED
Status: ACTIVE | OUTPATIENT
Start: 2017-01-01 | End: 2017-01-01

## 2017-01-01 RX ORDER — SCOLOPAMINE TRANSDERMAL SYSTEM 1 MG/1
1.5 PATCH, EXTENDED RELEASE TRANSDERMAL
Status: DISCONTINUED | OUTPATIENT
Start: 2017-01-01 | End: 2017-01-01 | Stop reason: HOSPADM

## 2017-01-01 RX ORDER — SODIUM CHLORIDE 0.9 % (FLUSH) 0.9 %
5 SYRINGE (ML) INJECTION AS NEEDED
Status: DISCONTINUED | OUTPATIENT
Start: 2017-01-01 | End: 2017-01-01 | Stop reason: HOSPADM

## 2017-01-01 RX ORDER — ONDANSETRON 2 MG/ML
4 INJECTION INTRAMUSCULAR; INTRAVENOUS
Status: DISCONTINUED | OUTPATIENT
Start: 2017-01-01 | End: 2017-01-01 | Stop reason: HOSPADM

## 2017-01-01 RX ORDER — MORPHINE SULFATE 5 MG/ML
INJECTION, SOLUTION INTRAVENOUS CONTINUOUS
Status: DISCONTINUED | OUTPATIENT
Start: 2017-01-01 | End: 2017-01-01 | Stop reason: HOSPADM

## 2017-01-01 RX ORDER — FACIAL-BODY WIPES
10 EACH TOPICAL DAILY PRN
Status: DISCONTINUED | OUTPATIENT
Start: 2017-01-01 | End: 2017-01-01 | Stop reason: HOSPADM

## 2017-01-01 RX ORDER — KETOROLAC TROMETHAMINE 30 MG/ML
30 INJECTION, SOLUTION INTRAMUSCULAR; INTRAVENOUS
Status: DISCONTINUED | OUTPATIENT
Start: 2017-01-01 | End: 2017-01-01 | Stop reason: HOSPADM

## 2017-01-01 RX ORDER — LEVOTHYROXINE SODIUM 100 UG/1
100 TABLET ORAL
Status: DISCONTINUED | OUTPATIENT
Start: 2017-01-01 | End: 2017-01-01 | Stop reason: HOSPADM

## 2017-01-01 RX ORDER — DOCUSATE SODIUM 100 MG/1
100 CAPSULE, LIQUID FILLED ORAL DAILY
Status: DISCONTINUED | OUTPATIENT
Start: 2017-01-01 | End: 2017-01-01 | Stop reason: HOSPADM

## 2017-01-01 RX ORDER — POTASSIUM CHLORIDE 750 MG/1
40 TABLET, FILM COATED, EXTENDED RELEASE ORAL
Status: COMPLETED | OUTPATIENT
Start: 2017-01-01 | End: 2017-01-01

## 2017-01-01 RX ORDER — ADENOSINE 3 MG/ML
INJECTION, SOLUTION INTRAVENOUS
Status: COMPLETED | OUTPATIENT
Start: 2017-01-01 | End: 2017-01-01

## 2017-01-01 RX ORDER — SODIUM CHLORIDE 9 MG/ML
25 INJECTION, SOLUTION INTRAVENOUS ONCE
Status: COMPLETED | OUTPATIENT
Start: 2017-01-01 | End: 2017-01-01

## 2017-01-01 RX ORDER — ONDANSETRON 4 MG/1
4 TABLET, FILM COATED ORAL 2 TIMES DAILY
COMMUNITY

## 2017-01-01 RX ORDER — ADENOSINE 3 MG/ML
12 INJECTION, SOLUTION INTRAVENOUS ONCE
Status: DISCONTINUED | OUTPATIENT
Start: 2017-01-01 | End: 2017-01-01 | Stop reason: SDUPTHER

## 2017-01-01 RX ORDER — DIGOXIN 0.25 MG/ML
125 INJECTION INTRAMUSCULAR; INTRAVENOUS DAILY
Status: DISCONTINUED | OUTPATIENT
Start: 2017-01-01 | End: 2017-01-01 | Stop reason: HOSPADM

## 2017-01-01 RX ORDER — SODIUM CHLORIDE AND POTASSIUM CHLORIDE .9; .15 G/100ML; G/100ML
SOLUTION INTRAVENOUS CONTINUOUS
Status: DISCONTINUED | OUTPATIENT
Start: 2017-01-01 | End: 2017-01-01 | Stop reason: HOSPADM

## 2017-01-01 RX ADMIN — LEVOTHYROXINE SODIUM 100 MCG: 100 TABLET ORAL at 07:19

## 2017-01-01 RX ADMIN — VANCOMYCIN HYDROCHLORIDE 1000 MG: 1 INJECTION, POWDER, LYOPHILIZED, FOR SOLUTION INTRAVENOUS at 10:52

## 2017-01-01 RX ADMIN — ACETAMINOPHEN 650 MG: 325 TABLET ORAL at 23:55

## 2017-01-01 RX ADMIN — LORAZEPAM 1 MG: 2 INJECTION, SOLUTION INTRAMUSCULAR; INTRAVENOUS at 04:40

## 2017-01-01 RX ADMIN — AZTREONAM 2 G: 2 INJECTION, POWDER, LYOPHILIZED, FOR SOLUTION INTRAMUSCULAR; INTRAVENOUS at 01:15

## 2017-01-01 RX ADMIN — MINERAL OIL, PETROLATUM, PHENYLEPHRINE HYDROCHLORIDE: 140; 749; 2.5 OINTMENT RECTAL; TOPICAL at 01:29

## 2017-01-01 RX ADMIN — Medication 10 ML: at 10:47

## 2017-01-01 RX ADMIN — POTASSIUM CHLORIDE 20 MEQ: 750 TABLET, FILM COATED, EXTENDED RELEASE ORAL at 18:18

## 2017-01-01 RX ADMIN — AZTREONAM 2 G: 2 INJECTION, POWDER, LYOPHILIZED, FOR SOLUTION INTRAMUSCULAR; INTRAVENOUS at 11:10

## 2017-01-01 RX ADMIN — ACETAMINOPHEN 650 MG: 325 TABLET ORAL at 10:45

## 2017-01-01 RX ADMIN — KETOROLAC TROMETHAMINE 30 MG: 30 INJECTION, SOLUTION INTRAMUSCULAR at 11:30

## 2017-01-01 RX ADMIN — SODIUM CHLORIDE 25 ML/HR: 900 INJECTION, SOLUTION INTRAVENOUS at 10:48

## 2017-01-01 RX ADMIN — DIPHENHYDRAMINE HYDROCHLORIDE 25 MG: 25 CAPSULE ORAL at 10:44

## 2017-01-01 RX ADMIN — AZTREONAM 2 G: 2 INJECTION, POWDER, LYOPHILIZED, FOR SOLUTION INTRAMUSCULAR; INTRAVENOUS at 18:27

## 2017-01-01 RX ADMIN — DIPHENHYDRAMINE HYDROCHLORIDE 25 MG: 25 CAPSULE ORAL at 11:20

## 2017-01-01 RX ADMIN — ACETAMINOPHEN 650 MG: 325 TABLET ORAL at 17:04

## 2017-01-01 RX ADMIN — LORAZEPAM 1 MG: 2 INJECTION, SOLUTION INTRAMUSCULAR; INTRAVENOUS at 01:01

## 2017-01-01 RX ADMIN — POTASSIUM CHLORIDE 40 MEQ: 750 TABLET, FILM COATED, EXTENDED RELEASE ORAL at 10:52

## 2017-01-01 RX ADMIN — OXYCODONE HYDROCHLORIDE 5 MG: 5 TABLET ORAL at 07:03

## 2017-01-01 RX ADMIN — POLYETHYLENE GLYCOL 3350 17 G: 17 POWDER, FOR SOLUTION ORAL at 12:08

## 2017-01-01 RX ADMIN — AZTREONAM 2 G: 2 INJECTION, POWDER, LYOPHILIZED, FOR SOLUTION INTRAMUSCULAR; INTRAVENOUS at 01:38

## 2017-01-01 RX ADMIN — ACETAMINOPHEN 1000 MG: 10 INJECTION, SOLUTION INTRAVENOUS at 00:02

## 2017-01-01 RX ADMIN — POTASSIUM CHLORIDE 30 MEQ: 750 TABLET, FILM COATED, EXTENDED RELEASE ORAL at 09:18

## 2017-01-01 RX ADMIN — SODIUM CHLORIDE 125 ML/HR: 900 INJECTION, SOLUTION INTRAVENOUS at 17:04

## 2017-01-01 RX ADMIN — LEVOFLOXACIN 750 MG: 5 INJECTION, SOLUTION INTRAVENOUS at 17:39

## 2017-01-01 RX ADMIN — DILTIAZEM HYDROCHLORIDE 5 MG/HR: 5 INJECTION, SOLUTION INTRAVENOUS at 16:14

## 2017-01-01 RX ADMIN — GLYCOPYRROLATE 0.2 MG: 0.2 INJECTION, SOLUTION INTRAMUSCULAR; INTRAVENOUS at 21:46

## 2017-01-01 RX ADMIN — TBO-FILGRASTIM 300 MCG: 300 INJECTION, SOLUTION SUBCUTANEOUS at 12:07

## 2017-01-01 RX ADMIN — POTASSIUM CHLORIDE 20 MEQ: 750 TABLET, FILM COATED, EXTENDED RELEASE ORAL at 12:08

## 2017-01-01 RX ADMIN — ACETAMINOPHEN 650 MG: 325 TABLET ORAL at 02:20

## 2017-01-01 RX ADMIN — ACETAMINOPHEN 650 MG: 325 TABLET ORAL at 14:09

## 2017-01-01 RX ADMIN — AZTREONAM 2 G: 2 INJECTION, POWDER, LYOPHILIZED, FOR SOLUTION INTRAMUSCULAR; INTRAVENOUS at 09:19

## 2017-01-01 RX ADMIN — DOCUSATE SODIUM 100 MG: 100 CAPSULE, LIQUID FILLED ORAL at 10:32

## 2017-01-01 RX ADMIN — Medication 10 ML: at 01:30

## 2017-01-01 RX ADMIN — DIGOXIN 250 MCG: 0.25 INJECTION INTRAMUSCULAR; INTRAVENOUS at 19:58

## 2017-01-01 RX ADMIN — OXYCODONE HYDROCHLORIDE 5 MG: 5 TABLET ORAL at 12:42

## 2017-01-01 RX ADMIN — Medication: at 20:07

## 2017-01-01 RX ADMIN — ACETAMINOPHEN 650 MG: 325 TABLET ORAL at 11:34

## 2017-01-01 RX ADMIN — DIGOXIN 250 MCG: 0.25 INJECTION INTRAMUSCULAR; INTRAVENOUS at 00:45

## 2017-01-01 RX ADMIN — ACETAMINOPHEN 650 MG: 325 TABLET ORAL at 08:03

## 2017-01-01 RX ADMIN — LEVOTHYROXINE SODIUM 100 MCG: 100 TABLET ORAL at 07:03

## 2017-01-01 RX ADMIN — Medication: at 10:23

## 2017-01-01 RX ADMIN — DILTIAZEM HYDROCHLORIDE 5 MG: 5 INJECTION INTRAVENOUS at 14:08

## 2017-01-01 RX ADMIN — Medication 10 ML: at 07:03

## 2017-01-01 RX ADMIN — TBO-FILGRASTIM 300 MCG: 300 INJECTION, SOLUTION SUBCUTANEOUS at 16:15

## 2017-01-01 RX ADMIN — SODIUM CHLORIDE 25 ML/HR: 900 INJECTION, SOLUTION INTRAVENOUS at 10:20

## 2017-01-01 RX ADMIN — OXYCODONE HYDROCHLORIDE 5 MG: 5 TABLET ORAL at 13:53

## 2017-01-01 RX ADMIN — DIPHENHYDRAMINE HYDROCHLORIDE 25 MG: 25 CAPSULE ORAL at 08:28

## 2017-01-01 RX ADMIN — VANCOMYCIN HYDROCHLORIDE 1000 MG: 1 INJECTION, POWDER, LYOPHILIZED, FOR SOLUTION INTRAVENOUS at 22:38

## 2017-01-01 RX ADMIN — FUROSEMIDE 20 MG: 10 INJECTION, SOLUTION INTRAMUSCULAR; INTRAVENOUS at 13:05

## 2017-01-01 RX ADMIN — POLYETHYLENE GLYCOL 3350 17 G: 17 POWDER, FOR SOLUTION ORAL at 10:32

## 2017-01-01 RX ADMIN — OXYCODONE HYDROCHLORIDE 5 MG: 5 TABLET ORAL at 17:32

## 2017-01-01 RX ADMIN — Medication 12 MG: at 09:44

## 2017-01-01 RX ADMIN — AZTREONAM 2 G: 2 INJECTION, POWDER, LYOPHILIZED, FOR SOLUTION INTRAMUSCULAR; INTRAVENOUS at 19:21

## 2017-01-01 RX ADMIN — POTASSIUM CHLORIDE AND SODIUM CHLORIDE: 900; 150 INJECTION, SOLUTION INTRAVENOUS at 09:20

## 2017-01-01 RX ADMIN — Medication 10 ML: at 10:19

## 2017-01-01 RX ADMIN — OXYCODONE HYDROCHLORIDE 5 MG: 5 TABLET ORAL at 05:35

## 2017-01-01 RX ADMIN — LEVOTHYROXINE SODIUM 100 MCG: 100 TABLET ORAL at 07:04

## 2017-01-01 RX ADMIN — LORAZEPAM 1 MG: 1 TABLET ORAL at 07:55

## 2017-01-01 RX ADMIN — DIGOXIN 250 MCG: 0.25 INJECTION INTRAMUSCULAR; INTRAVENOUS at 13:14

## 2017-01-01 RX ADMIN — AZTREONAM 2 G: 2 INJECTION, POWDER, LYOPHILIZED, FOR SOLUTION INTRAMUSCULAR; INTRAVENOUS at 17:45

## 2017-01-01 RX ADMIN — Medication 6 MG: at 09:39

## 2017-01-01 RX ADMIN — SODIUM CHLORIDE 250 ML: 900 INJECTION, SOLUTION INTRAVENOUS at 11:21

## 2017-01-01 RX ADMIN — Medication 6 MG: at 09:42

## 2017-01-01 RX ADMIN — ACETAMINOPHEN 650 MG: 325 TABLET ORAL at 15:38

## 2017-01-01 RX ADMIN — AZTREONAM 2 G: 2 INJECTION, POWDER, LYOPHILIZED, FOR SOLUTION INTRAMUSCULAR; INTRAVENOUS at 09:16

## 2017-01-01 RX ADMIN — LEVOTHYROXINE SODIUM 100 MCG: 100 TABLET ORAL at 07:55

## 2017-01-01 RX ADMIN — LORAZEPAM 1 MG: 1 TABLET ORAL at 19:49

## 2017-01-01 RX ADMIN — DIPHENHYDRAMINE HYDROCHLORIDE 25 MG: 25 CAPSULE ORAL at 10:45

## 2017-01-01 RX ADMIN — OXYCODONE HYDROCHLORIDE 5 MG: 5 TABLET ORAL at 03:01

## 2017-01-01 RX ADMIN — VANCOMYCIN HYDROCHLORIDE 1000 MG: 1 INJECTION, POWDER, LYOPHILIZED, FOR SOLUTION INTRAVENOUS at 05:41

## 2017-01-01 RX ADMIN — DIGOXIN 250 MCG: 0.25 INJECTION INTRAMUSCULAR; INTRAVENOUS at 07:18

## 2017-01-01 RX ADMIN — AZTREONAM 2 G: 2 INJECTION, POWDER, LYOPHILIZED, FOR SOLUTION INTRAMUSCULAR; INTRAVENOUS at 02:30

## 2017-01-01 RX ADMIN — SODIUM CHLORIDE 125 ML/HR: 900 INJECTION, SOLUTION INTRAVENOUS at 17:45

## 2017-01-01 RX ADMIN — AZTREONAM 2 G: 2 INJECTION, POWDER, LYOPHILIZED, FOR SOLUTION INTRAMUSCULAR; INTRAVENOUS at 18:19

## 2017-01-01 RX ADMIN — DIPHENHYDRAMINE HYDROCHLORIDE 25 MG: 25 CAPSULE ORAL at 10:22

## 2017-01-01 RX ADMIN — VANCOMYCIN HYDROCHLORIDE 1000 MG: 1 INJECTION, POWDER, LYOPHILIZED, FOR SOLUTION INTRAVENOUS at 05:16

## 2017-01-01 RX ADMIN — DOCUSATE SODIUM 100 MG: 100 CAPSULE, LIQUID FILLED ORAL at 09:18

## 2017-01-01 RX ADMIN — SODIUM CHLORIDE 125 ML/HR: 900 INJECTION, SOLUTION INTRAVENOUS at 01:47

## 2017-01-01 RX ADMIN — Medication: at 16:43

## 2017-01-01 RX ADMIN — SODIUM CHLORIDE 250 ML: 900 INJECTION, SOLUTION INTRAVENOUS at 10:45

## 2017-01-01 RX ADMIN — Medication 2 MG: at 09:56

## 2017-01-01 RX ADMIN — POLYETHYLENE GLYCOL 3350 17 G: 17 POWDER, FOR SOLUTION ORAL at 09:11

## 2017-01-01 RX ADMIN — DILTIAZEM HYDROCHLORIDE 2.5 MG/HR: 5 INJECTION, SOLUTION INTRAVENOUS at 14:18

## 2017-01-01 RX ADMIN — Medication 10 ML: at 15:11

## 2017-01-01 RX ADMIN — DOCUSATE SODIUM 100 MG: 100 CAPSULE, LIQUID FILLED ORAL at 09:11

## 2017-01-01 RX ADMIN — LEVOTHYROXINE SODIUM 100 MCG: 100 TABLET ORAL at 07:30

## 2017-01-01 RX ADMIN — ACETAMINOPHEN 650 MG: 325 TABLET ORAL at 08:27

## 2017-01-01 RX ADMIN — LORAZEPAM 1 MG: 2 INJECTION, SOLUTION INTRAMUSCULAR; INTRAVENOUS at 11:30

## 2017-01-01 RX ADMIN — ACETAMINOPHEN 650 MG: 325 TABLET ORAL at 11:20

## 2017-01-01 RX ADMIN — VANCOMYCIN HYDROCHLORIDE 1000 MG: 1 INJECTION, POWDER, LYOPHILIZED, FOR SOLUTION INTRAVENOUS at 17:41

## 2017-01-01 RX ADMIN — AZTREONAM 2 G: 2 INJECTION, POWDER, LYOPHILIZED, FOR SOLUTION INTRAMUSCULAR; INTRAVENOUS at 01:09

## 2017-01-01 RX ADMIN — POTASSIUM CHLORIDE 20 MEQ: 750 TABLET, FILM COATED, EXTENDED RELEASE ORAL at 09:11

## 2017-01-01 RX ADMIN — ACETAMINOPHEN 650 MG: 325 TABLET ORAL at 10:22

## 2017-01-01 RX ADMIN — FLUCONAZOLE 200 MG: 2 INJECTION, SOLUTION INTRAVENOUS at 11:12

## 2017-01-01 RX ADMIN — ACETAMINOPHEN 650 MG: 325 TABLET ORAL at 21:10

## 2017-01-01 RX ADMIN — ACETAMINOPHEN 650 MG: 325 TABLET ORAL at 19:49

## 2017-01-01 RX ADMIN — VANCOMYCIN HYDROCHLORIDE 1500 MG: 10 INJECTION, POWDER, LYOPHILIZED, FOR SOLUTION INTRAVENOUS at 17:04

## 2017-01-01 RX ADMIN — Medication: at 11:28

## 2017-01-01 RX ADMIN — OXYCODONE HYDROCHLORIDE 5 MG: 5 TABLET ORAL at 07:55

## 2017-01-01 RX ADMIN — SODIUM CHLORIDE 125 ML/HR: 900 INJECTION, SOLUTION INTRAVENOUS at 05:30

## 2017-01-01 RX ADMIN — MORPHINE SULFATE 2 MG: 2 INJECTION, SOLUTION INTRAMUSCULAR; INTRAVENOUS at 09:56

## 2017-01-01 RX ADMIN — GLYCOPYRROLATE 0.2 MG: 0.2 INJECTION, SOLUTION INTRAMUSCULAR; INTRAVENOUS at 04:40

## 2017-02-09 NOTE — PROGRESS NOTES
Outpatient Infusion Center Short Visit Progress Note    7932 Pt admit to [facility] for two units PRBCs ambulatory with rollator assist in stable condition. Assessment completed. No new concerns voiced. Oriented patient to [facility]. Visit Vitals    /72 (BP 1 Location: Left leg, BP Patient Position: Sitting)    Pulse 100    Temp 97.4 °F (36.3 °C)    Resp 16    SpO2 98%    Breastfeeding No     Patient Vitals for the past 12 hrs:   Temp Pulse Resp BP SpO2   02/09/17 1522 98 °F (36.7 °C) 75 16 132/68 -   02/09/17 1452 98 °F (36.7 °C) 82 16 135/76 -   02/09/17 1355 98.5 °F (36.9 °C) 81 16 128/70 -   02/09/17 1325 97.9 °F (36.6 °C) 79 16 138/76 -   02/09/17 1310 98.3 °F (36.8 °C) 77 16 126/68 -   02/09/17 1249 98.5 °F (36.9 °C) 75 16 131/74 -   02/09/17 1235 98.5 °F (36.9 °C) 75 16 130/69 -   02/09/17 1135 99.1 °F (37.3 °C) 100 16 124/68 -   02/09/17 1105 98.5 °F (36.9 °C) 100 16 118/63 -   02/09/17 1050 99.4 °F (37.4 °C) 100 16 119/65 -   02/09/17 1027 98.1 °F (36.7 °C) 100 16 131/71 -   02/09/17 1006 97.4 °F (36.3 °C) 100 16 136/72 98 %     PIV left forearm with positive blood return. Labs drawn, flushed, heparinized and de-accessed per protocol. Copy of discharge instructions given to patient and reviewed. Patient declined to stay the full hour post transfusion. Medications:  Normal saline  Tylenol  Benadryl  Two units PRBCs    1530 Pt tolerated treatment well. D/c home ambulatory with rollator assist in no distress. No future appointments at this time.

## 2017-02-09 NOTE — PROGRESS NOTES
OUTPATIENT INFUSION CENTER    DISCHARGE INSTRUCTIONS FOR:  BLOOD TRANSFUSION    We hope you are feeling better after your blood transfusion. Some mild tenderness or slight bruising at your IV site is normal.  Avoid lifting or heavy use of that extremity for the rest of the day. Drink plenty of fluids, eat a normal diet and get some rest.    There are some important signs that you need to watch for in case you experience a delayed reaction to the blood you have received. Call your physician immediately if you develop any of the following symptoms:    1. Severe headache or backache;    2. Fever above 100 degrees;    3. Chills;    4. Difficulty breathing;    5.  Blood or red color in urine;    6. The feeling of weakness or constant fatigue;    7. Yellowing of the whites of your eyes or skin (jaundice). If your physician is not available, call or go to the nearest emergency room, or dial 911.     Tosin Wetzel, Signature: ___________________________ 2/9/2017  Nolvia Pringle RN

## 2017-03-01 NOTE — DISCHARGE INSTRUCTIONS
OUTPATIENT INFUSION CENTER    DISCHARGE INSTRUCTIONS FOR:  BLOOD TRANSFUSION    We hope you are feeling better after your blood transfusion. Some mild tenderness or slight bruising at your IV site is normal.  Avoid lifting or heavy use of that extremity for the rest of the day. Drink plenty of fluids, eat a normal diet and get some rest.    There are some important signs that you need to watch for in case you experience a delayed reaction to the blood you have received. Call your physician immediately if you develop any of the following symptoms:    1. Severe headache or backache;    2. Fever above 100 degrees;    3. Chills;    4. Difficulty breathing;    5.  Blood or red color in urine;    6. The feeling of weakness or constant fatigue;    7. Yellowing of the whites of your eyes or skin (jaundice). If your physician is not available, call or go to the nearest emergency room, or dial 911.     Tosin Wetzel, Signature: ___________________________ 3/1/2017  Silvestre Rios RN

## 2017-03-02 NOTE — PROGRESS NOTES
Outpatient Infusion Center Progress Note    1658 Pt admit to [facility] for Blood Transfusion ambulatory with walker in stable condition. Assessment completed. No new concerns voiced. PIV established in the left arm with positive blood return. Patient Vitals for the past 24 hrs:   Temp Pulse Resp BP SpO2   03/01/17 1625 97 °F (36.1 °C) 74 18 152/80 -   03/01/17 1525 97.1 °F (36.2 °C) 75 16 138/64 -   03/01/17 1455 97.5 °F (36.4 °C) 69 18 134/74 -   03/01/17 1440 97 °F (36.1 °C) 72 18 119/70 -   03/01/17 1422 97.1 °F (36.2 °C) 74 18 119/68 -   03/01/17 1135 97.7 °F (36.5 °C) 68 16 115/64 -   03/01/17 1120 97.8 °F (36.6 °C) 70 16 111/62 -   03/01/17 1100 97.8 °F (36.6 °C) 90 18 121/63 -   03/01/17 1047 97.3 °F (36.3 °C) 90 18 119/69 99 %       Medications:  Tylenol  benadryl    1105 First unit initiated. 1400 First unit completed. 1425 Second unit initiated. 1625 Second unit completed. Pt refused to be monitored post transfusion. Educated and provided with written material regarding s/s of delayed reaction to watch for at home. 1625 Pt tolerated treatment well. D/c home ambulatory in no distress. IV removed without incidence. Pt to follow up with MD alfonso

## 2017-03-10 NOTE — DISCHARGE INSTRUCTIONS
OUTPATIENT INFUSION CENTER    DISCHARGE INSTRUCTIONS FOR:  BLOOD TRANSFUSION    We hope you are feeling better after your blood transfusion. Some mild tenderness or slight bruising at your IV site is normal.  Avoid lifting or heavy use of that extremity for the rest of the day. Drink plenty of fluids, eat a normal diet and get some rest.    There are some important signs that you need to watch for in case you experience a delayed reaction to the blood you have received. Call your physician immediately if you develop any of the following symptoms:    1. Severe headache or backache;    2. Fever above 100 degrees;    3. Chills;    4. Difficulty breathing;    5.  Blood or red color in urine;    6. The feeling of weakness or constant fatigue;    7. Yellowing of the whites of your eyes or skin (jaundice). If your physician is not available, call or go to the nearest emergency room, or dial 911.     Tosin Wetzel, Signature: ___________________________ 3/10/2017  Brandon Krueger RN

## 2017-03-11 NOTE — PROGRESS NOTES
57 Pt arrived to [facility] for Blood transfusion ambulatory with walker in stable condition. Assessment completed. PIV established in left arm with positive blood return. Patient Vitals for the past 12 hrs:   Temp Pulse Resp BP   03/10/17 1634 98.2 °F (36.8 °C) 73 18 144/77   03/10/17 1600 97.6 °F (36.4 °C) 71 18 130/77   03/10/17 1500 97.3 °F (36.3 °C) 85 18 125/68   03/10/17 1430 97.2 °F (36.2 °C) 78 18 134/68   03/10/17 1415 98.8 °F (37.1 °C) 73 18 143/64   03/10/17 1355 97.5 °F (36.4 °C) 72 18 124/70   03/10/17 1335 97.5 °F (36.4 °C) 73 18 134/72   03/10/17 1235 97.9 °F (36.6 °C) 74 18 125/69   03/10/17 1205 97.5 °F (36.4 °C) 72 18 125/69   03/10/17 1150 97.7 °F (36.5 °C) 68 18 104/62   03/10/17 1130 97.6 °F (36.4 °C) 76 18 115/62   03/10/17 1024 96.5 °F (35.8 °C) (!) 101 18 131/74   Medication received:  Tylenol   Benadryl  1135 First unit initiated  1335 First unit completed  1400 Second unit initiated  1600 Second unit completed. Pt refused to be monitored post transfusion. Educated and provided with written material regarding s/s of delay reaction to watch at home. 1640 Pt tolerated treatment well. D/c home ambulatory and in no distress. IV removed. Pt is to follow up with MD for future appt.

## 2017-03-16 NOTE — PROGRESS NOTES
1010  Pt arrived at [facility] ambulatory with walker and in no distress for transfusion of 2 units PRBCs/1 unit platelets. Assessment completed, no new complaints voiced. IV established in [address] [place] [address] without difficulty. Signs/symptoms of adverse blood reaction discussed with pt, voiced understanding. Medications received:   NS   Tylenol   Benadryl       1142  1 unit platelets started and infusing without difficulty, will monitor. 1215: 1st unit PRBCs started and infusing without difficulty, will monitor. 1415: 1st unit completed without adverse reaction noted, NS flushing line. 1440: 2nd unit PRBCs started and infusing without difficulty   1640: 2nd unit completed without adverse reaction noted, NS flushing line. 1720 Tolerated transfusion well, no adverse reaction noted. D/C instructions reviewed, copy to pt, voiced understanding. Patient declined 1 hour post transfusion observation. I recommended PT stay longer and I give her Tylenol for temp post TX of 99.2. PT declined, PT states she feels fine, I reminded her of the D/C instructions and when to call the MD.  Patient Vitals for the past 12 hrs:   Temp Pulse Resp BP   03/16/17 1720 99.2 °F (37.3 °C) 83 18 166/77   03/16/17 1640 98.7 °F (37.1 °C) 81 18 154/83   03/16/17 1540 97.7 °F (36.5 °C) 74 16 146/80   03/16/17 1510 97 °F (36.1 °C) 77 16 145/84   03/16/17 1455 97 °F (36.1 °C) 77 16 148/79   03/16/17 1435 97.1 °F (36.2 °C) 79 18 127/68   03/16/17 1415 97 °F (36.1 °C) 80 18 127/72   03/16/17 1315 97 °F (36.1 °C) 71 18 125/70   03/16/17 1245 97.2 °F (36.2 °C) 90 16 129/67   03/16/17 1230 97.9 °F (36.6 °C) 75 16 126/73   03/16/17 1157 97 °F (36.1 °C) 74 16 116/64   03/16/17 1135 98.3 °F (36.8 °C) 71 16 115/64   03/16/17 1013 97 °F (36.1 °C) 91 18 128/74       1720 D/Cd from [facility] ambulatory with walker and in no distress accompanied by self.

## 2017-03-16 NOTE — DISCHARGE INSTRUCTIONS
OUTPATIENT INFUSION CENTER    DISCHARGE INSTRUCTIONS FOR:  BLOOD TRANSFUSION    We hope you are feeling better after your blood transfusion. Some mild tenderness or slight bruising at your IV site is normal.  Avoid lifting or heavy use of that extremity for the rest of the day. Drink plenty of fluids, eat a normal diet and get some rest.    There are some important signs that you need to watch for in case you experience a delayed reaction to the blood you have received. Call your physician immediately if you develop any of the following symptoms:    1. Severe headache or backache;    2. Fever above 100 degrees;    3. Chills;    4. Difficulty breathing;    5.  Blood or red color in urine;    6. The feeling of weakness or constant fatigue;    7. Yellowing of the whites of your eyes or skin (jaundice). If your physician is not available, call or go to the nearest emergency room, or dial 911.     Tosin Wetzel, Signature: ___________________________ 3/16/2017  Elsy Weeks RN

## 2017-03-16 NOTE — PROGRESS NOTES
Problem: Anemia Care Plan (Adult and Pediatric)  Goal: *Labs within defined limits  Outcome: Progressing Towards Goal  PT arrived at [facility] ambulatory with walker for 2 units PRBC and 1 unit platelets, PT tolerated TX well.

## 2017-03-17 PROBLEM — A41.9 SEPSIS (HCC): Status: ACTIVE | Noted: 2017-01-01

## 2017-03-17 NOTE — PROGRESS NOTES
Aztreonam dose adjusted from 1 gm to 2 gm q8hr per protocol for sepsis and creatinine clearance >30 ml/min. Pharmacy to monitor daily and adjust if necessary.

## 2017-03-17 NOTE — PROGRESS NOTES
Bedside shift change report given to Jagjit Duong RN (oncoming nurse) by Afsaneh James RN (offgoing nurse). Report included the following information SBAR.     0411: Labs called with critical WBC and Platelets. Dr. Maynor Murray notified, no orders received. Dr. Maynor Murray notified of high temp, will continue to monitor. Son requested code for information to be given over the phone, 215.

## 2017-03-17 NOTE — PROGRESS NOTES
Pharmacist Note - Vancomycin Dosing    Consult provided for this 81 y.o. female for indication of sepsis. Antibiotic regimen(s): vancomycin, aztreonam    Recent Labs      17   1648   WBC  0.1*   CREA  0.86   BUN  18     Frequency of BMP: daily  Height: 160 cm  Weight: 66.5 kg  Est CrCl: 40-50 ml/min; Temp (24hrs), Av.8 °F (39.3 °C), Min:102.8 °F (39.3 °C), Max:102.8 °F (39.3 °C)    Cultures:  3/17: blood: in process    Goal trough = 15 - 20 mcg/mL    Therapy will be initiated with a loading dose of 1500 mg IV x 1 to be followed by a maintenance dose of 1000 mg IV every 18 hours. Pharmacy to follow patient daily and order levels / make dose adjustments as appropriate.

## 2017-03-17 NOTE — PROGRESS NOTES
200- Pt arrived from [address] [place] [address] office. Pt is A&Ox4, complaining of feeling weak. Pt is febrile 102.8, , /71- MEWS of 4. Dr. Cespedes  office notified and stated they will be putting orders in. Awaiting MD orders.

## 2017-03-17 NOTE — H&P
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 Disease Features  30% blasts in marrow at Dx. 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 MRSA. Fatigue  Pancytopenia due to treatment  Pancytopenia, due to disease  Erythematous nodules to both thighs, resolved with abx  Subcutaneous painful nodules in lower legs  red cell antibodies  DVT 2016, anticoagulation on hold     Other Medical Problems  Atrial fibrillation. Hearing loss. Hypothyroidism. Osteoarthritis. Reflux/Heartburn. Colonoscopy in . Cataract removal in . Tonsillectomy in 1939.    Post-menopause at 49. OCP's x 8 years; post-menopause hormones x 10 years.    Rash and nausea from subcutaneous Vidaza      Interim History and Assessment  Ms. Wetzel is feeling terrible today. She has her head down on the pillow and her temperature is 102 and she feels really weak and lousy. She does not have any localizing symptoms of mouth sores, abdominal pain, cough or chest pain, skin lesions, or urinary infection symptoms. She's been under treatment for her AML with low-dose araC and the new agent venetoclax. It's been about 2 weeks since the low-dose araC and she was due to start again next Monday but clearly we won't be doing that. Her absolute neutrophil count is 0 and her white cell count is 0.3. We will admit her to the hospital, do blood and urine culture, start her on IV fluids and IV antibiotics, do a chest film and consider Neupogen support. Family History: Mother  at age 76; history of lung cancer (was a smoker). Father  at the age of 64; history of alcoholism, heart disease, and liver disease. 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 Dating; happy. ROS  The ROS is positive for: fatigue, fever, weakness  Pertinent negatives include: No pulmonary, abdominal, , or cutaneous symptoms; no mucosiits  Except as noted above or in the interim history, a 10 point ROS is negative    Vital Signs  Vitals on 3/17/2017 1:27:00 PM: Height=63.5in, Weight=146.8lb, Temp=102.7f, FDTPS=389, ILXP=36, DYTQOYCULM=677, UAUBSELNLAM=58, Pulse FY=45%    Exam  ECOG Scale 3: Capable of only limited self care, confined to bed or chair more than 50% of waking hours General moderate distress. Looks frail. Normal respiratory effort  HEENT Anicteric. Conjunctiva + lids without inflammation. Oral No mucositis, no thrush  Nodes No cervical or supraclavicular adenopathy. No axillary or inguinal adenopathy  Lungs Clear to auscultation. CV Regular rhythm and rate. .  Abd Soft and non-tender without masses. No HSM. Extremities No edema or tenderness. Skin No significant rash.  Good turgor  Neuro CN's II - XII grossly normal.  Gait NL  Speech NL  Cognition NL  Affect NL    Labs  3/13/17 WBC=.5 ANC=.1 HGB=7.8 MCV=89.3 Plat=10  8/24/16 INR=1.1  3/13/17 Sodium=136.5 Potassium=3.6 OFU=178 Glucose=104 Creat=0.73 AST=13.7 ALT=11.5 Total Bili=2.7 Alk Phos=89.9 Calcium=8.6 eGFR, -American=109.6 eGFR, Non-=81.3    Lab Results Table  Lab results for 3/17/2017   Result Value Units Range Comment   WBC 0.3 K/uL 3.4-10.8 L=   RBC 3.2 M/uL 3.8-5.3    HGB 9.1 g/dL 11.1-15.9    HCT 27.5 % 34-46. 6    MCV 86.2 fL 79-97    MCH 28.4 pg 26.6-33    MCHC 33.0 g/dL 31.5-35.7    Plat 13 K/uL 150-379    ANC 0.0 K/uL 1.4-7    Lymph# 0.3 K/uL 0.7-3.1    EOS# 0.0 K/uL 0-0.4    Lymph% 89.1 % 14-46    EOS% 0.2 % 0-5    RDW 14.6 % 12.3-15.4          Hematology/Oncology Summary    8/5/13 - B12 of 497, CORRINE - negative. 10/7/13 - TSH 0.095, Creat 0.7  10/28/13 - Bone marrow biopsy - AML with 30% blasts; normal chromosomes; negative FISH; negative DNA studies for FLT; negative studies for myelodysplastic markers. 5/12/14 - Bone marrow biopsy - Persistent AML, but with the percentage of blasts decreased down to 11-15%. 11/6/14 - Bone marrow biopsy - 15% blasts, normal cytogenetics, FISH negative for MDS changes or high risk genetic changes. 11/10/14 - Doppler of lower extremities - negative for a clot. 5/27/15 - Bone marrow biopsy - residual acute myeloid leukemia (~16% blasts); 25% cellularity; FISH negative for MDS associated changes; 46,XX normal female karyotype   6/1/15 - Duplex U/S of right lower extremity at [facility] - SVT involving the greater saphenous vein from the distal thigh to distal calf   8/11/15 - Peripheral blood flow cytometry - minor circulating myeloblast population detected (0.9% of sample)  11/9/15 - Peripheral blood flow cytometry - minor myeloblast population (0.5%)  02/01/2016 - Peripheral blood flow cytometry - 0.5% detected ( using CD34 and ) without atypia.   3/30/16 - CT Chest @ [facility] - No evidence for infection in the chest.  4/12/16 - Peripheral blood flow cytometry @ [facility] - Increased myeloblasts (16% of sample), consistent with residual acute myeloid leukemia  4/12/16 - Bone Marrow Bx @ [facility] - Recurrent/residual acute myeloid leukemia (with 20-25% blasts; see comment). Compared to the previous bone marrow study there is a mild increase in blast burden (previous study showed 16% blasts). Chromosome analysis pending. 8/24/16 - Acute LLE DVT ( femoral, pop, peroneal v.)  8/25/16 - IVC filter placement at [facility]. [facility]  9/18/16- Valproic acid level 26 (6-22)  11/18/16- BmBx blasts 30% as at diagnosis 3 years ago    Other Physicians:  Blossom Reno [phone]~SO;  Provider Name Contact Information   Physician Hola Herrera MD    Consulting Provider Blossom Reno MD Fax: [phone], Work: [phone], Fax: [phone], Work: [phone]  Work: [employer], [id number]   Dermatologist Ana Lara M.D.  Fax: [phone], Work: [phone]  Work: [facility], [address]   Dermatologist Ml Mcgrath MD Fax: [phone], Work: [phone]  Work: [address] [facility], [address]   Primary Care Provider Blossom Reno MD Fax: [phone], Work: [phone], Fax: [phone], Work: [phone]  Work: [employer]

## 2017-03-18 NOTE — PROGRESS NOTES
03/17/17 2025   Vital Signs   Temp (!) 102.5 °F (39.2 °C)   Temp Source Oral   Pulse (Heart Rate) (!) 101   Heart Rate Source Monitor   Resp Rate 20   O2 Sat (%) 96 %   Level of Consciousness Alert   /72   MAP (Calculated) 95   BP 1 Method Automatic   BP 1 Location Right arm   MEWS Score 4   High temp, MD aware. Tylenol given.

## 2017-03-18 NOTE — PROGRESS NOTES
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 Disease Features  30% blasts in marrow at Dx. 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 MRSA. Fatigue  Pancytopenia due to treatment  Pancytopenia, due to disease  Erythematous nodules to both thighs, resolved with abx  Subcutaneous painful nodules in lower legs  red cell antibodies  DVT 2016, anticoagulation on hold     Other Medical Problems  Atrial fibrillation. Hearing loss. Hypothyroidism. Osteoarthritis. Reflux/Heartburn. Colonoscopy in . Cataract removal in . Tonsillectomy in 1939.    Post-menopause at 49. OCP's x 8 years; post-menopause hormones x 10 years.    Rash and nausea from subcutaneous Vidaza      Interim History and Assessment  Admitted yesterday with feeling poorly She has her head down on the pillow and her temperature is 102 and she feels really weak and lousy. She does not have any localizing symptoms of mouth sores, abdominal pain, cough or chest pain, skin lesions, or urinary infection symptoms. She's been under treatment for her AML with low-dose araC and the new agent venetoclax. It's been about 2 weeks since the low-dose araC and she was due to start again next Monday but clearly we won't be doing that. Her absolute neutrophil count is 0 and her white cell count is 0.3. Admitted for V fluids and IV antibiotics  So far cultures are negative, check chest xray  Elevated bili-check abd US  Hypokalemia-replete      Family History: Mother  at age 76; history of lung cancer (was a smoker). Father  at the age of 64; history of alcoholism, heart disease, and liver disease. 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 Dating; happy. ROS  The ROS is positive for: fatigue, fever, weakness  Pertinent negatives include: No pulmonary, abdominal, , or cutaneous symptoms; no mucosiits  Except as noted above or in the interim history, a 10 point ROS is negative    Vital Signs  Vitals on 3/17/2017 1:27:00 PM: Height=63.5in, Weight=146.8lb, Temp=102.7f, ELNIB=021, QZYN=31, QBZTQEGYTK=855, BCJKJBLMLOA=52, Pulse RD=24%    Exam  ECOG Scale 3: Capable of only limited self care, confined to bed or chair more than 50% of waking hours General moderate distress. Looks frail. Normal respiratory effort  HEENT Anicteric. Conjunctiva + lids without inflammation. Oral No mucositis, no thrush  Lungs Clear to auscultation. CV Regular rhythm and rate. .  Abd Soft and non-tender without masses. No HSM. Extremities No edema or tenderness. Skin No significant rash.  Good turgor  Neuro CN's II - XII grossly normal.  Speech NL  Cognition NL  Affect NL    Labs  3/13/17 WBC=.5 ANC=.1 HGB=7.8 MCV=89.3 Plat=10  8/24/16 INR=1.1  3/13/17 Sodium=136.5 Potassium=3.6 LFR=138 Glucose=104 Creat=0.73 AST=13.7 ALT=11.5 Total Bili=2.7 Alk Phos=89.9 Calcium=8.6 eGFR, -American=109.6 eGFR, Non-=81.3    Lab Results Table  Lab results for 3/17/2017   Result Value Units Range Comment   WBC 0.3 K/uL 3.4-10.8 L=   RBC 3.2 M/uL 3.8-5.3    HGB 9.1 g/dL 11.1-15.9    HCT 27.5 % 34-46. 6    MCV 86.2 fL 79-97    MCH 28.4 pg 26.6-33    MCHC 33.0 g/dL 31.5-35.7    Plat 13 K/uL 150-379    ANC 0.0 K/uL 1.4-7    Lymph# 0.3 K/uL 0.7-3.1    EOS# 0.0 K/uL 0-0.4    Lymph% 89.1 % 14-46    EOS% 0.2 % 0-5    RDW 14.6 % 12.3-15.4          Hematology/Oncology Summary    8/5/13 - B12 of 497, CORRINE - negative. 10/7/13 - TSH 0.095, Creat 0.7  10/28/13 - Bone marrow biopsy - AML with 30% blasts; normal chromosomes; negative FISH; negative DNA studies for FLT; negative studies for myelodysplastic markers. 5/12/14 - Bone marrow biopsy - Persistent AML, but with the percentage of blasts decreased down to 11-15%. 11/6/14 - Bone marrow biopsy - 15% blasts, normal cytogenetics, FISH negative for MDS changes or high risk genetic changes. 11/10/14 - Doppler of lower extremities - negative for a clot. 5/27/15 - Bone marrow biopsy - residual acute myeloid leukemia (~16% blasts); 25% cellularity; FISH negative for MDS associated changes; 46,XX normal female karyotype   6/1/15 - Duplex U/S of right lower extremity at [facility] - SVT involving the greater saphenous vein from the distal thigh to distal calf   8/11/15 - Peripheral blood flow cytometry - minor circulating myeloblast population detected (0.9% of sample)  11/9/15 - Peripheral blood flow cytometry - minor myeloblast population (0.5%)  02/01/2016 - Peripheral blood flow cytometry - 0.5% detected ( using CD34 and ) without atypia.   3/30/16 - CT Chest @ [facility] - No evidence for infection in the chest.  4/12/16 - Peripheral blood flow cytometry @ [facility] - Increased myeloblasts (16% of sample), consistent with residual acute myeloid leukemia  4/12/16 - Bone Marrow Bx @ [facility] - Recurrent/residual acute myeloid leukemia (with 20-25% blasts; see comment). Compared to the previous bone marrow study there is a mild increase in blast burden (previous study showed 16% blasts). Chromosome analysis pending. 8/24/16 - Acute LLE DVT ( femoral, pop, peroneal v.)  8/25/16 - IVC filter placement at [facility]  9/18/16- Valproic acid level 26 (6-22)  11/18/16- BmBx blasts 30% as at diagnosis 3 years ago    Other Physicians:  Nicholas Moran [phone]~SO;  Provider Name Contact Information   Physician Julia Orona MD    Consulting Provider Nicholas Moran MD Fax: [phone], Work: [phone], Fax: [phone], Work: [phone]  Work: [place], [address]   Dermatologist Stefan Dodd M.D.  Fax: [phone], Work: [phone]  Work: [facility], [address]   Dermatologist Consuelo Mireles MD Fax: [phone], Work: [phone]  Work: [address] [address]   Primary Care Provider Nicholas Moran MD Fax: [phone], Work: [phone], Fax: [phone], Work: [phone]  Work: [place] [place]

## 2017-03-18 NOTE — PROGRESS NOTES
MEWS score of 4. Administered Tylenol.       03/18/17 0812   Vital Signs   Temp (!) 101.2 °F (38.4 °C)   Temp Source Oral   Pulse (Heart Rate) (!) 104   Heart Rate Source Monitor   Resp Rate 18   O2 Sat (%) 98 %   Level of Consciousness Alert   /63   MAP (Calculated) 87   BP 1 Method Automatic   BP 1 Location Left arm   MEWS Score 4   Informed Dr. Ricardo Trejo, who was rounding on the patient. Will continue to follow treatment.

## 2017-03-18 NOTE — PROGRESS NOTES
Second page attempted to reach the on call physician regarding pt unable to void/urinary retention.  Awaiting callback

## 2017-03-18 NOTE — PROGRESS NOTES
Bedside shift change report given to Morelia Olsen RN and Kady Brar RN (oncoming nurse) by Lina Tony RN (offgoing nurse). Report included the following information SBAR, Kardex and Recent Results.

## 2017-03-18 NOTE — PROGRESS NOTES
Bedside and Verbal shift change report given to [address] (oncoming nurse) by LAURENCE Guerrero RN (offgoing nurse). Report given with SBAR, Kardex, Intake/Output, MAR and Recent Results.

## 2017-03-19 NOTE — PROGRESS NOTES
Bedside shift change report given to Gavin Rich RN and Arsenio Knapp RN (oncoming nurse) by Aundrea Bustamante RN (offgoing nurse). Report included the following information SBAR, Kardex and Recent Results.

## 2017-03-19 NOTE — PROGRESS NOTES
1305 Rapid response initiated, pulse and RR elevated. 1 Notified Dr. Dyana Calixto of pt's elevated pulse and RR, pt coughing up bloody sputum, RRT called. Blood transfusion stopped. Orders received for troponin, EKG and transfer to [facility].    1325 Notified Dr. Lina Wright that RRT was called and pt being transferred to [facility].

## 2017-03-19 NOTE — PROGRESS NOTES
ATSP regarding RRT for fever, tachycardia  rectal pain, MEW 5  Blood pressure 152/75, pulse (!) 109, temperature (!) 101.7 °F (38.7 °C), resp. rate (!) 31, height 5' 2.99\" (1.6 m), weight 66.5 kg (146 lb 9.6 oz), SpO2 94 %. Ms Hiwot Lemus is a 81year old [place] [address] admitted to Dr. Mendez Push service with pancytopenia s/p MITA C, and ventoclax for treatment fo AML. Patient reporting rectal pain with small amount of maroon discharge RRT activated secondary to elevate MEW 5.  GEN: ill appearing elderly female in mild discomfort  Chest diminished at bases bilateraly  Abdomen distended hypoactive BS TTP   Ext + pulses  LABS  WBC 0.1, hgb 7.9, plt 9  Lactic 0.9 Phos 1.6, mag 1.9  CT ABD 3/18/17  IMPRESSION:   1.LUNG BASES: Dense right lower lobe airspace disease. Mild right middle lobe  airspace disease and left lower lobe airspace disease. Calcification right lower  lobe unchanged  2. No nephrolithiasis or hydronephrosis. 3. Pelvic  Uterus is present. Either solitary large pelvic cyst  between the rectum and uterus or bilateral ovarian cysts which in total measure  10 x 4 cm. A/p  Pancytopenia- with rectal pain new large cysts- t/c consult gyn surgery   T/c Oral laxative for bowel movement  - continue neutropenic precautions  - transfuse plt as per hematology  -IV tylenol x1  - Continue atbx with new ASDZ on ct scan  - pulmonary toilet- may need bipap  - The hospitalist team appreciates the opportunity to assist in this urgent matter. However Nsg has been advised to contact primary medical service ( Dr. Hemal Hernadez).   Khai Brown, JOSH

## 2017-03-19 NOTE — PROGRESS NOTES
14:45 Bedside shift change report given to Ata Ingram RN (oncoming nurse) by Vida Beatty RN (offgoing nurse). Report included the following information SBAR, Kardex, Intake/Output, MAR, Recent Results, Med Rec Status and Cardiac Rhythm A Fib.

## 2017-03-19 NOTE — PROGRESS NOTES
TRANSFER - IN REPORT:    Verbal report received from Katelyn Rosado(name) on Tosin Wetzel  being received from [facility](unit) for urgent transfer      Report consisted of patients Situation, Background, Assessment and   Recommendations(SBAR). Information from the following report(s) SBAR, MAR, Recent Results and Med Rec Status was reviewed with the receiving nurse. Opportunity for questions and clarification was provided. Assessment completed upon patients arrival to unit and care assumed.

## 2017-03-19 NOTE — PROGRESS NOTES
Medical Progress Note      NAME: Tosin Wetzel   :  1935  MRM:  [id number]    Date/Time: 3/19/2017  9:41 AM         Problem List:     Active Problems:    Sepsis ([facility] (3/17/2017)             Subjective:     Patient  Had a bad night- rectal pain, abdominal pain    Past Medical History:   Diagnosis Date    AML (acute myeloblastic leukemia) ([facility]     GERD (gastroesophageal reflux disease)     HTN (hypertension)     Thyroid activity decreased        ROS:  Respiratory:  positive for sob  Cardiology:  negative for chest pain, palpitations, orthopnea, PND, edema, syncope   Gastrointestinal: positive for ruq and rectal pain         Objective:       Vitals:          Last 24hrs VS reviewed since prior progress note. Most recent are:    Visit Vitals    /56 (BP 1 Location: Left arm, BP Patient Position: At rest)    Pulse 96    Temp 98.4 °F (36.9 °C)    Resp 18    Ht 5' 2.99\" (1.6 m)    Wt 146 lb 9.6 oz (66.5 kg)    SpO2 98%    BMI 25.98 kg/m2     SpO2 Readings from Last 6 Encounters:   17 98%   17 99%   17 98%   16 97%   16 100%   16 97%    O2 Flow Rate (L/min): 3 l/min     Intake/Output Summary (Last 24 hours) at 17 0941  Last data filed at 17 0820   Gross per 24 hour   Intake             3549 ml   Output             1650 ml   Net             1899 ml          Exam:     General   Pale 82 yo wf  Respiratory   Dec bs in bases  Cardiology  Regular Rate and Rhythm  - no murmurs, rubs or gallops  Abdominal  ruq tenderness. Sacrum sl red with stool adhering  Extremities  No clubbing, cyanosis, or edema. Pulses intact.     Lab Data Reviewed: (see below)    Medications Reviewed: (see below)    ______________________________________________________________________    Medications:     Current Facility-Administered Medications   Medication Dose Route Frequency    sodium chloride (NS) flush 5-10 mL  5-10 mL IntraVENous PRN    0.9% sodium chloride infusion 250 mL 250 mL IntraVENous PRN    diphenhydrAMINE (BENADRYL) capsule 25 mg  25 mg Oral PRN    docusate sodium (COLACE) capsule 100 mg  100 mg Oral DAILY    oxyCODONE IR (ROXICODONE) tablet 5 mg  5 mg Oral Q6H PRN    phenyleph-shark luis oil-mo-pet (PREPARATION H) ointment   PeriANAL QID PRN    LORazepam (ATIVAN) tablet 1 mg  1 mg Oral Q4H PRN    0.9% sodium chloride infusion  125 mL/hr IntraVENous CONTINUOUS    levothyroxine (SYNTHROID) tablet 100 mcg  100 mcg Oral ACB    ondansetron (ZOFRAN) injection 4 mg  4 mg IntraVENous Q4H PRN    acetaminophen (TYLENOL) tablet 650 mg  650 mg Oral Q4H PRN    aztreonam (AZACTAM) 2 g in 0.9% sodium chloride (MBP/ADV) 100 mL  2 g IntraVENous Q8H    Vancomycin pharmacy to dose  1 Each Other Rx Dosing/Monitoring    vancomycin (VANCOCIN) 1,000 mg in 0.9% sodium chloride (MBP/ADV) 250 mL  1,000 mg IntraVENous Q18H            Lab Review:     Recent Labs      03/19/17   0543  03/18/17   2334  03/18/17   0230   WBC  0.1*  0.1*  0.2*   HGB  6.6*  7.9*  8.7*   HCT  18.7*  22.4*  24.7*   PLT  7*  9*  10*     Recent Labs      03/19/17   0543  03/18/17   2334  03/18/17   0230  03/17/17   1648   NA  139  137  137  132*   K  3.3*  3.8  3.1*  3.0*   CL  109*  105  104  98   CO2  22  23  25  26   GLU  95  98  120*  144*   BUN  19  21*  19  18   CREA  0.61  0.67  0.76  0.86   CA  8.1*  7.9*  7.9*  8.3*   MG   --   1.9   --   1.9   PHOS   --   1.6*   --   2.4*   ALB  1.9*  2.1*  2.5*  3.0*   TBILI  2.5*  3.8*  5.9*  5.5*   SGOT  9*  19  17  21   ALT  15  20  22  24   INR   --   1.4*   --   1.1     Lab Results   Component Value Date/Time    Glucose (POC) 103 03/18/2017 11:31 PM     No results for input(s): PH, PCO2, PO2, HCO3, FIO2 in the last 72 hours.   Recent Labs      03/18/17   2334  03/17/17   1648   INR  1.4*  1.1       Other pertinent lab: NA         Assessment:     Patient Active Problem List   Diagnosis Code    Thyroid activity decreased E03.9    HTN (hypertension) I10    AML (acute myeloblastic leukemia) ([facility] C92.00    Neutropenic fever ([facility] D70.9, R50.81    Neutropenia ([facility] D70.9    Leg DVT (deep venous thromboembolism), acute (HCC) I82.409    Anemia D64.9    Sepsis (HCC) A41.9          Plan:                 1. aml- per heme/onc,  Receiving plt transfusion  2. Abd/rectal pain. Add miralax  3. Pneumonia- on vanc and aztreonam  4.  Replete K                ___________________________________________________    Attending Physician: Nikita Multani MD

## 2017-03-19 NOTE — PROGRESS NOTES
Patient is receiving blood transfusion. This is in line with treatment. 1249. Igor Saldivar RN is phoning provider with news of increase of MEWS score to 3, SOB, and new-onset bloody sputum.

## 2017-03-19 NOTE — PROGRESS NOTES
Responded to Code RRT in 616. Provided supportive presence to patient's son and daughter-in-law as they shared patient's extensive medical history. Son expressed that there have been many recent changes and that they family is beginning to understand what this may mean for the patient. Escorted family to patient's new room in 12. Advised of  Availability. Chaplains will follow as able and/or needed. ITZ Danielle.Div.    Paging Service [phone]

## 2017-03-19 NOTE — PROGRESS NOTES
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 Dense right lower lobe airspace disease. 2. No nephrolithiasis or hydronephrosis. 3. Pelvic cyst/cysts as above. US abd  Gallbladder sludge with trace pericholecystic fluid. No biliary ductal  dilatation. No evidence of acute cholecystitis. Chest Xray  IMPRESSION:  Persistent right basilar airspace disease. Current medications, progress notes, vital signs, radiology, and labs reviewed. Disease Features  30% blasts in marrow at Dx. 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 MRSA.    Fatigue  Pancytopenia due to treatment  Pancytopenia, due to disease  Erythematous nodules to both thighs, resolved with abx  Subcutaneous painful nodules in lower legs  red cell antibodies  DVT 8/2016, anticoagulation on hold     Other Medical Problems  Atrial fibrillation. Hearing loss. Hypothyroidism. Osteoarthritis. Reflux/Heartburn. Colonoscopy in . Cataract removal in . Tonsillectomy in 1939.    Post-menopause at 49. OCP's x 8 years; post-menopause hormones x 10 years. Rash and nausea from subcutaneous Vidaza      Interim History and Assessment  Admitted yesterday with feeling poorly She has her head down on the pillow and her temperature is 102 and she feels really weak and lousy. She does not have any localizing symptoms of mouth sores, abdominal pain, cough or chest pain, skin lesions, or urinary infection symptoms. She's been under treatment for her AML with low-dose araC and the new agent venetoclax. It's been about 2 weeks since the low-dose araC and she was due to start again next Monday but clearly we won't be doing that. Her absolute neutrophil count is 0 and her white cell count is 0.3. Admitted for V fluids and IV antibiotics  Elevated bili-abd US negative, bili better  abd pain- CT abd /pelvis showed some pelvic cysts, consider GYN consult next week  BLD cx shows Gm + cocci in pairs and chains, will see what speciation shows, CXR shows possible pneumonia- Cont Vanc and aztreonam  HBG down- transfuse PRBC  PLTs down- transfuse PLTs  Hypokalemia-replete      Family History: Mother  at age 76; history of lung cancer (was a smoker). Father  at the age of 64; history of alcoholism, heart disease, and liver disease. 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 Dating; happy.      ROS  The ROS is positive for: fatigue, fever, weakness  Pertinent negatives include: No pulmonary, abdominal, , or cutaneous symptoms; no mucosiits  Except as noted above or in the interim history, a 10 point ROS is negative    Vital Signs  Vitals on 3/17/2017 1:27:00 PM: Height=63.5in, Weight=146.8lb, Temp=102.7f, TZSCI=078, VBMK=98, TZJDXEGHMC=967, PIDUOZZYDXG=15, Pulse Ox=93%    Exam  ECOG Scale 3: Capable of only limited self care, confined to bed or chair more than 50% of waking hours General moderate distress. Looks frail. Normal respiratory effort  HEENT Anicteric. Conjunctiva + lids without inflammation. Oral No mucositis, no thrush  Lungs Clear to auscultation. CV Regular rhythm and rate. .  Abd Soft and non-tender without masses. No HSM. Extremities No edema or tenderness. Skin No significant rash. Good turgor  Neuro CN's II - XII grossly normal.  Speech NL  Cognition NL  Affect NL    Labs  3/13/17 WBC=.5 ANC=.1 HGB=7.8 MCV=89.3 Plat=10  8/24/16 INR=1.1  3/13/17 Sodium=136.5 Potassium=3.6 CDL=332 Glucose=104 Creat=0.73 AST=13.7 ALT=11.5 Total Bili=2.7 Alk Phos=89.9 Calcium=8.6 eGFR, -American=109.6 eGFR, Non-=81.3    Lab Results Table  Lab results for 3/17/2017   Result Value Units Range Comment   WBC 0.3 K/uL 3.4-10.8 L=   RBC 3.2 M/uL 3.8-5.3    HGB 9.1 g/dL 11.1-15.9    HCT 27.5 % 34-46. 6    MCV 86.2 fL 79-97    MCH 28.4 pg 26.6-33    MCHC 33.0 g/dL 31.5-35.7    Plat 13 K/uL 150-379    ANC 0.0 K/uL 1.4-7    Lymph# 0.3 K/uL 0.7-3.1    EOS# 0.0 K/uL 0-0.4    Lymph% 89.1 % 14-46    EOS% 0.2 % 0-5    RDW 14.6 % 12.3-15.4          Hematology/Oncology Summary    8/5/13 - B12 of 497, CORRINE - negative. 10/7/13 - TSH 0.095, Creat 0.7  10/28/13 - Bone marrow biopsy - AML with 30% blasts; normal chromosomes; negative FISH; negative DNA studies for FLT; negative studies for myelodysplastic markers. 5/12/14 - Bone marrow biopsy - Persistent AML, but with the percentage of blasts decreased down to 11-15%. 11/6/14 - Bone marrow biopsy - 15% blasts, normal cytogenetics, FISH negative for MDS changes or high risk genetic changes. 11/10/14 - Doppler of lower extremities - negative for a clot.   5/27/15 - Bone marrow biopsy - residual acute myeloid leukemia (~16% blasts); 25% cellularity; FISH negative for MDS associated changes; 46,XX normal female karyotype   6/1/15 - Duplex U/S of right lower extremity at [facility] - SVT involving the greater saphenous vein from the distal thigh to distal calf   8/11/15 - Peripheral blood flow cytometry - minor circulating myeloblast population detected (0.9% of sample)  11/9/15 - Peripheral blood flow cytometry - minor myeloblast population (0.5%)  02/01/2016 - Peripheral blood flow cytometry - 0.5% detected ( using CD34 and ) without atypia. 3/30/16 - CT Chest @ [facility] - No evidence for infection in the chest.  4/12/16 - Peripheral blood flow cytometry @ [facility] - Increased myeloblasts (16% of sample), consistent with residual acute myeloid leukemia  4/12/16 - Bone Marrow Bx @ [facility] - Recurrent/residual acute myeloid leukemia (with 20-25% blasts; see comment). Compared to the previous bone marrow study there is a mild increase in blast burden (previous study showed 16% blasts). Chromosome analysis pending. 8/24/16 - Acute LLE DVT ( femoral, pop, peroneal v.)  8/25/16 - IVC filter placement at [facility]. [facility]  9/18/16- Valproic acid level 26 (6-22)  11/18/16- BmBx blasts 30% as at diagnosis 3 years ago    Other Physicians:  Sarita Francisco [phone]~SO;  Provider Name Contact Information   Physician Willis Sanchez MD    Consulting Provider Sarita Francisco MD Fax: [phone], Work: [phone], Fax: [phone], Work: [phone]  Work: [place] [place], [address]   Dermatologist Kristy Leonardo M.D.  Fax: [phone], Work: [phone]  Work: [facility], [address]   Dermatologist Bishop Hakan MD Fax: [phone], Work: [phone]  Work: [facility], [address]   Primary Care Provider Sarita Francisco MD Fax: [phone], Work: [phone], Fax: [phone], Work: [phone]  Work: [employer] [place]

## 2017-03-19 NOTE — PROGRESS NOTES
03/18/17 2250   Vital Signs   Temp (!) 101.7 °F (38.7 °C)   Temp Source Oral   Pulse (Heart Rate) (!) 109   Heart Rate Source Brachial   Resp Rate (!) 31   O2 Sat (%) 94 %   Level of Consciousness Alert   /75   MAP (Calculated) 101   BP 1 Method Automatic   BP 1 Location Right arm   BP Patient Position At rest   MEWS Score 6     Dr. Nir Zayas paged to request a stat KUB due to c/o rectal pain, distended abdomen, tachycardia, and tachypnea.

## 2017-03-19 NOTE — PROGRESS NOTES
Rapid response called. - patient had increased heart rate and temp earlier while receiving blood. Denied chest pain,  Had some sob and weakness. Had some blood in sputum. Chest xray showed pleural effusion and asdz. Troponin negative and ekg showed sinus tach. Improved after lasix. Fluids kvo'd. Patient drinking dinner. Symptoms improved.

## 2017-03-19 NOTE — PROGRESS NOTES
Problem: Pressure Ulcer - Risk of  Goal: *Prevention of pressure ulcer  Outcome: Progressing Towards Goal  Patient up with assistance and able to turn self with prompting and minimal assistance. No evidence of skin breakdown. Problem: Falls - Risk of  Goal: *Absence of falls  Outcome: Progressing Towards Goal  Patient has not fallen during shift. Call bell within reach. Tray table within reach. Bed in lowest position. Non-skid socks on patient's feet when ambulating. Appropriate lighting in room. Patient up with assistance and able to ambulate independently at baseline. Problem: Sepsis: Day 3  Goal: Treatments/Interventions/Procedures  Outcome: Progressing Towards Goal  Antibiotics being administered as scheduled. Fluids held due to potential volume overload with blood administration. Blood cultures drawn 14 hours prior to arrival on floor. Initial lactic negative.

## 2017-03-19 NOTE — PROGRESS NOTES
Bedside shift change report given to Irma RN (oncoming nurse) by Shaq Corona RN (offgoing nurse). Report included the following information SBAR, MAR, Recent Results and Med Rec Status. 0821:Patient requested a stool softener and Dr. Mary Lou Smith ordered colace daily. 1249: Patient is receiving blood transfusion and mews was a 3. B/p and heart rate elevated. She also cough up some bloody sputum. Dr. Dariela Gutierrez was contacted and order lasix 20 mg IV now.

## 2017-03-19 NOTE — PROGRESS NOTES
1430: TRANSFER - IN REPORT:    Verbal report received from [facility]. AT [place], RN(name) on [address]  being received from [address] (unit) for change in patient condition(abnormal VS, suspected transfusion reaction)      Report consisted of patients Situation, Background, Assessment and   Recommendations(SBAR). Information from the following report(s) SBAR, Kardex, ED Summary, Procedure Summary, Intake/Output, MAR, Recent Results and Med Rec Status was reviewed with the receiving nurse. Opportunity for questions and clarification was provided. Assessment completed upon patients arrival to unit and care assumed. SKIN ASSESSMENT:  Primary Nurse Elodia Jordan and Hola Sanchez RN performed a dual skin assessment on this patient No impairment noted  Luis score is 23        1445: Spoke to Dr. Alfredo Cervantes regarding suspected transfusion reaction vs. Fluid overload. Portable CXR and CBC ordered. Will reassess finishing blood transfusion based on results. Bedside shift change report given to Tayla Ewing (oncoming nurse) by Macarena Mesa RN (offgoing nurse). Report included the following information SBAR, Kardex, ED Summary, Procedure Summary, Intake/Output, MAR, Recent Results and Med Rec Status. Opportunity for questions provided.

## 2017-03-19 NOTE — PROGRESS NOTES
Bedside shift change report given to July Mora RN (oncoming nurse) by Celeste Forrseter (offgoing nurse). Report included the following information SBAR and MAR.     2100 pm - Call received from Dr. Trupti Carlson regarding patient and family request for Preparation H. Patient c/o rectal pain. Order received to administer Preparation H PRN. 2245 pm - Went to patient's room to administer Preparation H rectally. Patient appears tachypnic and is c/o rectal pain 10/10. Patient is hot to touch. Patient is on 3 liters via NC. Vital Signs obtained and MEWS is a 6 due to elevated temperature, tachycardia and tachypnea. Patient's abdomen is distended and patient states she has not had a BM in 3 days. When Preparation H was applied, small amount of maroon colored stool noted in rectum. Patient's Platelets are 16 today. Dr. Trupti Carlson paged to request a stat KUB and to advise of patient's fever, tachycardia and tachypnea. Awaiting a return call. 2315 pm - Dr. Trupti Carlson returned the call regarding above information. Orders received to consult hospitalist and to call a rapid response on the patient. RRT activated. 0100 am - Hospitalist service stated that since Dr. Arturo Vaughan group is patient's PCP, they should be notified regarding the RRT and the new orders for patient (labwork, CT of abdomen, CXR). Dr. Ruth Lee called to notify of the above information. Advised Dr. Ruth Lee of CT of abdomen results and CXR results. Dr. Ruth Lee will evaluate patient later today (March 19, 2017). Inquired if Oncologist should be notified of Platelets of 9. Dr. Ruth Lee stated not to notify Oncologist unless there was active bleeding noted from the patient. Will continue to monitor patient's vital signs per unit routine. 0622 am - Call placed to Dr. Trupit Carlson regarding Critical Platelets (7) and WBC (0.1). Also advised him that Heme test was Positive. Order received to transfuse 2 units of blood and 6 units of Platelets.   Pre-medicate patient with Tylenol and Benadryl. No further orders at this time.

## 2017-03-20 PROBLEM — R00.0 SINUS TACHYCARDIA: Status: ACTIVE | Noted: 2017-01-01

## 2017-03-20 PROBLEM — D69.6 THROMBOCYTOPENIA (HCC): Status: ACTIVE | Noted: 2017-01-01

## 2017-03-20 NOTE — PROGRESS NOTES
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  Pancytopenia  Sepsis, with strep bacteremia  red cell antibodies  DVT 8/2016, anticoagulation on hold 1/17  Inflammatory lesion anterior left shin, fat necrosis   Multilobar pneumonia    Interim History and Assessment   Had a bad weekend; ached all over;   Still febrile; weak; no N/V  CT scan showed multilobar pneumonia; pelvic cysts - no work-up till she gets over this complication of her AML treatment - a bimanual exam would drive more bacteria into her blood stream  Consult ID for better antibiotic regimen given culture findings  Add fluconazole given her prolonged neutropenia  Will start G-CSF in the hopes of getting some granulocytic recovery. Hopefully she'll recover and has had some good benefit of treatment; hold off on cycle II indefinitely. CT abd/pelvis  IMPRESSION:     1. Dense right lower lobe airspace disease. 2. No nephrolithiasis or hydronephrosis. 3. Pelvic cyst/cysts as above. US abd  Gallbladder sludge with trace pericholecystic fluid. No biliary ductal  dilatation. No evidence of acute cholecystitis. Chest Xray  IMPRESSION:  Persistent right basilar airspace disease. Current medications, progress notes, vital signs, radiology, and labs reviewed. Disease Features  30% blasts in marrow at Dx.   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 MRSA. Fatigue  Pancytopenia due to treatment  Pancytopenia, due to disease  Erythematous nodules to both thighs, resolved with abx  Subcutaneous painful nodules in lower legs  red cell antibodies  DVT 2016, anticoagulation on hold     Other Medical Problems  Atrial fibrillation. Hearing loss. Hypothyroidism. Osteoarthritis. Reflux/Heartburn. Colonoscopy in . Cataract removal in . Tonsillectomy in 1939.    Post-menopause at 49. OCP's x 8 years; post-menopause hormones x 10 years. Rash and nausea from subcutaneous Vidaza      Interim History and Assessment  Admission 3/17/17  Admitted yesterday with feeling poorly She has her head down on the pillow and her temperature is 102 and she feels really weak and lousy. She does not have any localizing symptoms of mouth sores, abdominal pain, cough or chest pain, skin lesions, or urinary infection symptoms. She's been under treatment for her AML with low-dose araC and the new agent venetoclax. It's been about 2 weeks since the low-dose araC and she was due to start again next Monday but clearly we won't be doing that. Her absolute neutrophil count is 0 and her white cell count is 0.3. Admitted for V fluids and IV antibiotics  Elevated bili-abd US negative, bili better  abd pain- CT abd /pelvis showed some pelvic cysts, consider GYN consult next week  BLD cx shows Gm + cocci in pairs and chains, will see what speciation shows, CXR shows possible pneumonia- Cont Vanc and aztreonam  HBG down- transfuse PRBC  PLTs down- transfuse PLTs  Hypokalemia-replete      Family History:  Mother  at age 76; history of lung cancer (was a smoker). Father  at the age of 64; history of alcoholism, heart disease, and liver disease. 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 Dating; happy. ROS  The ROS is positive for: fatigue, fever, weakness  Pertinent negatives include: No pulmonary, abdominal, , or cutaneous symptoms; no mucosiits  Except as noted above or in the interim history, a 10 point ROS is negative    Vital Signs  Vitals on 3/17/2017 1:27:00 PM: Height=63.5in, Weight=146.8lb, Temp=102.7f, HJYIW=358, HCPG=36, JAESDTRSJV=982, FABWRISBCLI=19, Pulse DON=82%    Exam  ECOG Scale 3: Capable of only limited self care, confined to bed or chair more than 50% of waking hours General moderate distress. Looks frail. Normal respiratory effort  HEENT Anicteric. Conjunctiva + lids without inflammation. Oral No mucositis, no thrush  Lungs Clear to auscultation. CV Regular rhythm and rate. .  Abd Soft and non-tender without masses. No HSM. Extremities No edema or tenderness. Skin No significant rash. Good turgor  Neuro CN's II - XII grossly normal.  Speech NL  Cognition NL  Affect NL    Labs  3/13/17 WBC=.5 ANC=.1 HGB=7.8 MCV=89.3 Plat=10  16 INR=1.1  3/13/17 Sodium=136.5 Potassium=3.6 RTD=780 Glucose=104 Creat=0.73 AST=13.7 ALT=11.5 Total Bili=2.7 Alk Phos=89.9 Calcium=8.6 eGFR, -American=109.6 eGFR, Non-=81.3    Lab Results Table  Lab results for 3/17/2017   Result Value Units Range Comment   WBC 0.3 K/uL 3.4-10.8 L=   RBC 3.2 M/uL 3.8-5.3    HGB 9.1 g/dL 11.1-15.9    HCT 27.5 % 34-46. 6    MCV 86.2 fL 79-97    MCH 28.4 pg 26.6-33    MCHC 33.0 g/dL 31.5-35.7    Plat 13 K/uL 150-379    ANC 0.0 K/uL 1.4-7    Lymph# 0.3 K/uL 0.7-3.1    EOS# 0.0 K/uL 0-0.4    Lymph% 89.1 % 14-46    EOS% 0.2 % 0-5    RDW 14.6 % 12.3-15.4          Hematology/Oncology Summary    13 - B12 of 497, CORRINE - negative.   10/7/13 - TSH 0.095, Creat 0.7  10/28/13 - Bone marrow biopsy - AML with 30% blasts; normal chromosomes; negative FISH; negative DNA studies for FLT; negative studies for myelodysplastic markers. 5/12/14 - Bone marrow biopsy - Persistent AML, but with the percentage of blasts decreased down to 11-15%. 11/6/14 - Bone marrow biopsy - 15% blasts, normal cytogenetics, FISH negative for MDS changes or high risk genetic changes. 11/10/14 - Doppler of lower extremities - negative for a clot. 5/27/15 - Bone marrow biopsy - residual acute myeloid leukemia (~16% blasts); 25% cellularity; FISH negative for MDS associated changes; 46,XX normal female karyotype   6/1/15 - Duplex U/S of right lower extremity at [facility] - SVT involving the greater saphenous vein from the distal thigh to distal calf   8/11/15 - Peripheral blood flow cytometry - minor circulating myeloblast population detected (0.9% of sample)  11/9/15 - Peripheral blood flow cytometry - minor myeloblast population (0.5%)  02/01/2016 - Peripheral blood flow cytometry - 0.5% detected ( using CD34 and ) without atypia. 3/30/16 - CT Chest @ [facility] - No evidence for infection in the chest.  4/12/16 - Peripheral blood flow cytometry @ [facility] - Increased myeloblasts (16% of sample), consistent with residual acute myeloid leukemia  4/12/16 - Bone Marrow Bx @ [facility] - Recurrent/residual acute myeloid leukemia (with 20-25% blasts; see comment). Compared to the previous bone marrow study there is a mild increase in blast burden (previous study showed 16% blasts). Chromosome analysis pending.    8/24/16 - Acute LLE DVT ( femoral, pop, peroneal v.)  8/25/16 - IVC filter placement at [facility]. [facility]  9/18/16- Valproic acid level 26 (6-22)  11/18/16- BmBx blasts 30% as at diagnosis 3 years ago    Other Physicians:  Blossom Reno [phone]~SO;  Provider Name Contact Information   Physician Hola Herrera MD    Consulting Provider Blossom Reno MD Fax: [phone], Work: [phone], Fax: [phone], Work: [phone]  Work: [place], [address]   Dermatologist Pritesh Bower M.D.  Fax: [phone], Work: [phone]  Work: [facility], [address]   Dermatologist Radames Lisa MD Fax: [phone], Work: [phone]  Work: [address] [facility], [address]   Primary Care Provider Isha Townsend MD Fax: [phone], Work: [phone], Fax: [phone], Work: [phone]  Work: [employer] [place]

## 2017-03-20 NOTE — PROGRESS NOTES
Dr Navarro Monge notified. No new orders, given prn tylenol  And continue with current plan.  Pt on 3 IV abx and ID has  Been consulted

## 2017-03-20 NOTE — PROGRESS NOTES
Pt asleep, appears to be resting comfortably during bedside verbal report. Pt rested well during the night. Bedside and Verbal shift change report given to Trudy Barcenas (oncoming nurse) by Eddie Hyman (offgoing nurse).  Report included the following information SBAR, Kardex, Intake/Output, MAR, Recent Results and Cardiac Rhythm ST.

## 2017-03-20 NOTE — PROGRESS NOTES
03/20/17 0325   Vitals   Temp 98 °F (36.7 °C)   Temp Source Oral   Pulse (Heart Rate) (!) 106   Heart Rate Source Monitor   Resp Rate 22   O2 Sat (%) 95 %   Level of Consciousness Alert   /83   MAP (Calculated) 108   BP 1 Location Left arm   BP 1 Method Automatic   BP Patient Position At rest   Cardiac Rhythm Sinus Tach   MEWS Score 3   Pt c/o back pain (does not rate on 0-10 scale). Medicated per prn order, offered to reposition pt, pt declined.

## 2017-03-20 NOTE — CDMP QUERY
There is noted documentation of \"pancytopenia due to treatment\" on the medical record. Could this be further specified as:     =>Pancytopenia due to chemotherapy in the setting of AML requiring PRBC's and platelets  =>Other Explanation of clinical findings  =>Unable to Determine (no explanation of clinical findings)    The medical record reflects the following clinical findings, treatment, and risk factors:    Risk Factors: AML; on chemo    Clinical Indicators: 3/19 HEM ONC PN:   Pancytopenia due to treatment    Treatment: PRBC's, PLT's monitoring    Please clarify and document your clinical opinion in the progress notes and discharge summary including the definitive and/or presumptive diagnosis, (suspected or probable), related to the above clinical findings. Please include clinical findings supporting your diagnosis.     Thank Lauren Keenan [facility]  [phone]

## 2017-03-20 NOTE — PROGRESS NOTES
03/20/17 0001   Vitals   Temp 100.3 °F (37.9 °C)   Temp Source Oral   Pulse (Heart Rate) (!) 121   Heart Rate Source Monitor   Resp Rate 23   O2 Sat (%) 93 %   Level of Consciousness Alert   /80   MAP (Calculated) 104   BP 1 Location Left arm   BP 1 Method Automatic   BP Patient Position At rest   Cardiac Rhythm Sinus Tach   MEWS Score 4   Pain 1   Pain Scale 1 Numeric (0 - 10)   Pain Intensity 1 0   Oxygen Therapy   O2 Device Nasal cannula   O2 Flow Rate (L/min) 4 l/min   Patient Observation   Patient Turned Turns self;Supine   Hourly Rounds Yes  (q1h)   Pt given tylenol for elevated temp. Will continue to monitor.

## 2017-03-20 NOTE — PROGRESS NOTES
's Art Tracier, Vickie & Mohan    Admit Date: 3/17/2017    Subjective:     Events noted. Pt remains pancytopenic and chilled. 's. Current Facility-Administered Medications   Medication Dose Route Frequency    0.9% sodium chloride with KCl 20 mEq/L infusion   IntraVENous CONTINUOUS    sodium chloride (NS) flush 5-10 mL  5-10 mL IntraVENous PRN    0.9% sodium chloride infusion 250 mL  250 mL IntraVENous PRN    diphenhydrAMINE (BENADRYL) capsule 25 mg  25 mg Oral PRN    docusate sodium (COLACE) capsule 100 mg  100 mg Oral DAILY    polyethylene glycol (MIRALAX) packet 17 g  17 g Oral DAILY    potassium chloride SR (KLOR-CON 10) tablet 20 mEq  20 mEq Oral BID    oxyCODONE IR (ROXICODONE) tablet 5 mg  5 mg Oral Q6H PRN    phenyleph-shark luis oil-mo-pet (PREPARATION H) ointment   PeriANAL QID PRN    LORazepam (ATIVAN) tablet 1 mg  1 mg Oral Q4H PRN    levothyroxine (SYNTHROID) tablet 100 mcg  100 mcg Oral ACB    ondansetron (ZOFRAN) injection 4 mg  4 mg IntraVENous Q4H PRN    acetaminophen (TYLENOL) tablet 650 mg  650 mg Oral Q4H PRN    aztreonam (AZACTAM) 2 g in 0.9% sodium chloride (MBP/ADV) 100 mL  2 g IntraVENous Q8H    Vancomycin pharmacy to dose  1 Each Other Rx Dosing/Monitoring    vancomycin (VANCOCIN) 1,000 mg in 0.9% sodium chloride (MBP/ADV) 250 mL  1,000 mg IntraVENous Q18H          Objective:     Patient Vitals for the past 8 hrs:   BP Temp Pulse Resp SpO2 Weight   03/20/17 0755 167/72 - - - - -   03/20/17 0745 177/77 98.6 °F (37 °C) - 22 - -   03/20/17 0729 156/76 98.4 °F (36.9 °C) (!) 121 20 - -   03/20/17 0456 - - - - - 152 lb 12.5 oz (69.3 kg)   03/20/17 0325 159/83 98 °F (36.7 °C) (!) 106 22 95 % -   03/20/17 0111 - 99.6 °F (37.6 °C) - - - -     03/20 0701 - 03/20 1900  In: 350 [I.V.:350]  Out: 450 [Urine:450]  03/18 1901 - 03/20 0700  In: 1135 [P.O.:120; I.V.:1015]  Out: 5765 [Urine:3050]    Physical Exam: NAD. A&O.                              Neck -- Supple. No JVD. Heart -- RR. Rate 110's. Lungs -- CTA. Abd -- Benign. Ext -- No LE edema, b/l. Data Review   Recent Results (from the past 24 hour(s))   TROPONIN I    Collection Time: 03/19/17  1:23 PM   Result Value Ref Range    Troponin-I, Qt. <0.04 <0.05 ng/mL   TRANSFUSION REACTION    Collection Time: 03/19/17  1:30 PM   Result Value Ref Range    Unit Number UNIT [id number]     Clerical Errors None detected     Pre-txfusion hemolysis: SLIGHT     Post-txfusion hemolysis: NONE     Blood bag hemolysis: NONE     Direct Epifanio,pre-txfusion Not required     Direct Epifanio,post-txfusion NEG     PATHOLOGIST INTERP:       Additional products approved for transfusion, Crista Barclay MD, 3/19/17 1500    Component Type RED CELL GROUP     Blood type,post-txn A  POS       MD Interpretation PENDING    TYPE & SCREEN    Collection Time: 03/19/17  1:30 PM   Result Value Ref Range    Crossmatch Expiration 03/22/2017     ABO/Rh(D) A POSITIVE     Antibody screen NEG     Comment       Previously identified anti C and Nonspecific antibody    Unit number [id number]     Blood component type RC LRIR AS1     Unit division 00     Status of unit ALLOCATED     ANTIGEN/ANTIBODY INFO C NEGATIVE,     Crossmatch result Compatible    CBC WITH AUTOMATED DIFF    Collection Time: 03/19/17  6:51 PM   Result Value Ref Range    WBC 0.1 (LL) 3.6 - 11.0 K/uL    RBC 2.71 (L) 3.80 - 5.20 M/uL    HGB 7.9 (L) 11.5 - 16.0 g/dL    HCT 22.7 (L) 35.0 - 47.0 %    MCV 83.8 80.0 - 99.0 FL    MCH 29.2 26.0 - 34.0 PG    MCHC 34.8 30.0 - 36.5 g/dL    RDW 14.8 (H) 11.5 - 14.5 %    PLATELET 9 (LL) 859 - 400 K/uL    NEUTROPHILS 20 (L) 32 - 75 %    BAND NEUTROPHILS 2 0 - 6 %    LYMPHOCYTES 73 (H) 12 - 49 %    MONOCYTES 5 5 - 13 %    EOSINOPHILS 0 0 - 7 %    BASOPHILS 0 0 - 1 %    ABS. NEUTROPHILS 0.0 (L) 1.8 - 8.0 K/UL    ABS.  LYMPHOCYTES 0.1 (L) 0.8 - 3.5 K/UL    ABS. MONOCYTES 0.0 0.0 - 1.0 K/UL    ABS. EOSINOPHILS 0.0 0.0 - 0.4 K/UL    ABS. BASOPHILS 0.0 0.0 - 0.1 K/UL    DF MANUAL      RBC COMMENTS ANISOCYTOSIS  1+        RBC COMMENTS POLYCHROMASIA  PRESENT        RBC COMMENTS OVALOCYTES  PRESENT        WBC COMMENTS REACTIVE LYMPHS     METABOLIC PANEL, COMPREHENSIVE    Collection Time: 03/20/17  3:09 AM   Result Value Ref Range    Sodium 142 136 - 145 mmol/L    Potassium 3.6 3.5 - 5.1 mmol/L    Chloride 110 (H) 97 - 108 mmol/L    CO2 24 21 - 32 mmol/L    Anion gap 8 5 - 15 mmol/L    Glucose 97 65 - 100 mg/dL    BUN 24 (H) 6 - 20 MG/DL    Creatinine 0.64 0.55 - 1.02 MG/DL    BUN/Creatinine ratio 38 (H) 12 - 20      GFR est AA >60 >60 ml/min/1.73m2    GFR est non-AA >60 >60 ml/min/1.73m2    Calcium 8.3 (L) 8.5 - 10.1 MG/DL    Bilirubin, total 4.3 (H) 0.2 - 1.0 MG/DL    ALT (SGPT) 14 12 - 78 U/L    AST (SGOT) 7 (L) 15 - 37 U/L    Alk. phosphatase 90 45 - 117 U/L    Protein, total 5.4 (L) 6.4 - 8.2 g/dL    Albumin 1.8 (L) 3.5 - 5.0 g/dL    Globulin 3.6 2.0 - 4.0 g/dL    A-G Ratio 0.5 (L) 1.1 - 2.2     CBC WITH AUTOMATED DIFF    Collection Time: 03/20/17  3:09 AM   Result Value Ref Range    WBC 0.1 (LL) 3.6 - 11.0 K/uL    RBC 2.52 (L) 3.80 - 5.20 M/uL    HGB 7.3 (L) 11.5 - 16.0 g/dL    HCT 21.0 (L) 35.0 - 47.0 %    MCV 83.3 80.0 - 99.0 FL    MCH 29.0 26.0 - 34.0 PG    MCHC 34.8 30.0 - 36.5 g/dL    RDW 14.9 (H) 11.5 - 14.5 %    PLATELET 10 (LL) 781 - 400 K/uL    NEUTROPHILS 6 (L) 32 - 75 %    BAND NEUTROPHILS 1 0 - 6 %    LYMPHOCYTES 90 (H) 12 - 49 %    MONOCYTES 3 (L) 5 - 13 %    EOSINOPHILS 0 0 - 7 %    BASOPHILS 0 0 - 1 %    ABS. NEUTROPHILS 0.0 (L) 1.8 - 8.0 K/UL    ABS. LYMPHOCYTES 0.1 (L) 0.8 - 3.5 K/UL    ABS. MONOCYTES 0.0 0.0 - 1.0 K/UL    ABS. EOSINOPHILS 0.0 0.0 - 0.4 K/UL    ABS.  BASOPHILS 0.0 0.0 - 0.1 K/UL    DF MANUAL      RBC COMMENTS ANISOCYTOSIS  1+        RBC COMMENTS OVALOCYTES  PRESENT        RBC COMMENTS POLYCHROMASIA  PRESENT        WBC COMMENTS REACTIVE LYMPHS             Assessment:     Principal Problem:    Sepsis ([facility] [address] -- ? PNA      Active Problems:    HTN (hypertension) ()      AML (acute myeloblastic leukemia) (HCC) ()      Neutropenic fever ([facility] [address] (3/30/2016)      Anemia (11/11/2016)      Thrombocytopenia ([address] (3/20/2017)      Sinus tachycardia (3/20/2017)        Plan:     1. On IV Aztreonam & IV Vanc. 2. Mgmt of Pancytopenia & AML per Dr. Elliot Granados. 3. I am starting gentle IVF.           Heather Pettit MD

## 2017-03-20 NOTE — PROGRESS NOTES
Pharmacist Note - Vancomycin Dosing  Therapy day 4  Indication: Sepsis secondary to possible GPC bacteremia (vs contaminant)  - Pancytopenia, AML (not in remission) - on deneen-C / Venetoclax 2/25/17-   Current regimen: 1 gm IV Q 18 hr    A Trough Level resulted at 6.4 mcg/mL which was obtained 18 hrs post-dose. Goal trough: 15 - 20 mcg/mL     Plan: Change to 1 gm q12h   Pharmacy will continue to monitor this patient daily for changes in clinical status and renal function.

## 2017-03-20 NOTE — PROGRESS NOTES
Pt says she feels a little better this evening. ... However, she remains quite ill. Dr. Elizabeth Montes' input appreciated, and I agree with addition of Levaquin. Hopefully her bone marrow will \"kick in\" soon. .... D/w Dr. Nilsa Ocampo this am, and his care is appreciated.     Julita Gomez -- cell, [phone]

## 2017-03-20 NOTE — PROGRESS NOTES
Pt temp 102.7 Dr Galarza Ranks called, no new orders received. Given PRN tylenol. Pt on 3 IV abx and ID was consulted earlier today.

## 2017-03-20 NOTE — PROGRESS NOTES
Chart reviewed. The patient was admitted for a fever 3 days ago and is on neutropenic precautions. She lives at [facility] in the [facility]. Anticipate that she will return home when she is medically stable. CM will follow for discharge needs. Care Management Interventions  PCP Verified by CM:  Yes (Dr. Wilfredo Vee)  Mode of Transport at Discharge: Self  MyChart Signup: No  Discharge Durable Medical Equipment: No  Physical Therapy Consult: No  Occupational Therapy Consult: No  Speech Therapy Consult: No  Current Support Network: Own Home  Confirm Follow Up Transport: Family  Discharge Location  Discharge Placement: Home  Miky Ramirez RN BS CRM

## 2017-03-21 NOTE — PROGRESS NOTES
CCU RN spoke with pt's son, who states that he would like for her to be shocked out of the Rhythm if needed

## 2017-03-21 NOTE — PROGRESS NOTES
NUTRITION       Nutrition screening referral was triggered based on results obtained during nursing admission assessment. Chart reviewed, discussed with RN and team during interdisciplinary rounds. Pt is s/p rapid response and plan to forgo cardioversion and initiate comfort measures. Full nutrition assessment is not indicated at this time, but will gladly assist as needed. Thank you.     [address]

## 2017-03-21 NOTE — PROGRESS NOTES
Discussion with son and CCU RN results in agreement not to shock, as pt is a DNR. CCU RN calling MD back to discuss with MD, and possibly get something to make pt more comfortable.

## 2017-03-21 NOTE — PROGRESS NOTES
Spiritual Care Assessment/Progress Notes    Olayinka Forrester [id number]  [id number]    1935  81 y.o.  female    Patient Telephone Number: [phone] (home)   Jainism Affiliation: Jessi   Language: English   Extended Emergency Contact Information  Primary Emergency Contact: Kyle Weber Phone: [phone]  Relation: Child   Patient Active Problem List    Diagnosis Date Noted    Thrombocytopenia ([facility] [address] 03/20/2017    Sinus tachycardia 03/20/2017    Sepsis ([facility] [address] 03/17/2017    Anemia 11/11/2016    Neutropenia ([facility] [address] 08/25/2016    Leg DVT (deep venous thromboembolism[facility] 08/25/2016    Neutropenic fever ([facility] 03/30/2016    AML (acute myeloblastic leukemia) ([facility]     HTN (hypertension)     Thyroid activity decreased         Date: 3/21/2017       Level of Jainism/Spiritual Activity:  []         Involved in hudson tradition/spiritual practice    []         Not involved in hudson tradition/spiritual practice  []         Spiritually oriented    []         Claims no spiritual orientation    []         seeking spiritual identity  []         Feels alienated from Rastafarian practice/tradition  []         Feels angry about Rastafarian practice/tradition  [x]         Spirituality/Rastafarian tradition IS a resource for coping at this time.   []         Not able to assess due to medical condition    Services Provided Today:  [x]         crisis intervention    []         reading Scriptures  [x]         spiritual assessment    []         prayer  [x]         empathic listening/emotional support  []         rites and rituals (cite in comments)  []         life review     []         Rastafarian support  []         theological development   []         advocacy  []         ethical dialog     []         blessing  []         bereavement support    [x]         support to family  []         anticipatory grief support   []         help with AMD  []         spiritual guidance    [] meditation      Spiritual Care Needs  [x]         Emotional Support  [x]         Spiritual/Druze Care  []         Loss/Adjustment  []         Advocacy/Referral                /Ethics  []         No needs expressed at               this time  []         Other: (note in               comments)  [address]  []         Follow up visits with               pt/family  []         Provide materials  []         Schedule sacraments  []         Contact Community               Clergy  [x]         Follow up as needed  []         Other: (note in               comments)     Comments:  Responded to RRT called for Ms Wetzel in room 406. Multiple staff members were working with Ms Wetzel; provided emotional support to patient's son, who was present at the time. Son shared that patient had been diagnosed a couple of years ago with Leukemia and that she had not been expected to last as long as she had. Said that patient had not been actively attending any particular Hinduism but was a believer. Provided emotional support and words of comfort to patient. Patient's second son arrived just prior to  leaving. Assured patient and family of prayers on their behalf and of 24-hour  availability for support. : . Kuldip Hernandez.  Jordana Christensen; [facility], to contact [phone] [address] call: [phone]

## 2017-03-21 NOTE — PROGRESS NOTES
ID Progress Note  3/21/2017    Subjective:     Events of the last 24 hours noted--rapid a fib--more emphasis on comfort. Objective:     Antibiotics:  1. Vancomycin   2. Levaquin  3. Azactam       Vitals:   Visit Vitals    /58    Pulse (!) 215    Temp (!) 102.2 °F (39 °C)    Resp 30    Ht 5' 3\" (1.6 m)    Wt 69.1 kg (152 lb 5.4 oz)    SpO2 96%    BMI 26.99 kg/m2        Tmax:  Temp (24hrs), Av.6 °F (37.6 °C), Min:98.3 °F (36.8 °C), Max:102.2 °F (39 °C)      Exam:  Lungs:  clear to auscultation bilaterally  Heart:  irregularly irregular rhythm  Abdomen:  soft, non-tender. Bowel sounds normal. No masses,  no organomegaly    Labs:      Recent Labs      17   0531  17   0309  17   1851  17   0543  17   2334   WBC  0.1*  0.1*  0.1*  0.1*  0.1*   HGB  6.7*  7.3*  7.9*  6.6*  7.9*   PLT  5*  10*  9*  7*  9*   BUN  33*  24*   --   19  21*   CREA  0.72  0.64   --   0.61  0.67   SGOT   --   7*   --   9*  19   AP   --   90   --   82  87   TBILI   --   4.3*   --   2.5*  3.8*       Cultures:     No results found for: [facility]  Lab Results   Component Value Date/Time    Culture result: NO GROWTH 2 DAYS 2017 11:34 PM    Culture result: MIXED UROGENITAL GLENDY ISOLATED 2017 06:26 PM    Culture result:  2017 04:48 PM     PROBABLE  STREPTOCOCCUS SPECIES  GROWING IN 1 OF 4 BOTTLES DRAWN  (SITE = L WRIST)      Culture result:  2017 04:48 PM     PRELIMINARY REPORT OF  GRAM POSITIVE COCCI   IN PAIRS AND CHAINS   GROWING IN 1 OF 4 BOTTLES DRAWN  CALLED TO AND READ BACK BY   JOHN LANGFORD ON 3/18/17 AT 0561. [address]      Culture result: REMAINING BOTTLE(S) HAS/HAVE NO GROWTH SO FAR 2017 04:48 PM       Radiology:     Line/Insert Date:           Assessment:     1. Neutropenia  2. Fevers   3. Positive blood culture    Objective:     1. Broad antibiotic coverage  2.  Poor prognosis    Kirsten Mayer MD

## 2017-03-21 NOTE — PROGRESS NOTES
Emmanuels Claudeen Elders, Vickie Chin & Mohan    Admit Date: 3/17/2017    Subjective:     Pt sleeping comfortably, and she did not awaken during my exam.  WBC 0.1, Hgb 6.7, plts 5K        Current Facility-Administered Medications   Medication Dose Route Frequency    0.9% sodium chloride with KCl 20 mEq/L infusion   IntraVENous CONTINUOUS    fluconazole (DIFLUCAN) 200mg/100 mL IVPB (premix)  200 mg IntraVENous Q24H    tbo-filgrastim (GRANIX) injection 300 mcg  300 mcg SubCUTAneous DAILY    levoFLOXacin (LEVAQUIN) 750 mg in D5W IVPB  750 mg IntraVENous Q24H    vancomycin (VANCOCIN) 1,000 mg in 0.9% sodium chloride (MBP/ADV) 250 mL  1,000 mg IntraVENous Q12H    sodium chloride (NS) flush 5-10 mL  5-10 mL IntraVENous PRN    0.9% sodium chloride infusion 250 mL  250 mL IntraVENous PRN    diphenhydrAMINE (BENADRYL) capsule 25 mg  25 mg Oral PRN    docusate sodium (COLACE) capsule 100 mg  100 mg Oral DAILY    polyethylene glycol (MIRALAX) packet 17 g  17 g Oral DAILY    oxyCODONE IR (ROXICODONE) tablet 5 mg  5 mg Oral Q6H PRN    phenyleph-shark luis oil-mo-pet (PREPARATION H) ointment   PeriANAL QID PRN    LORazepam (ATIVAN) tablet 1 mg  1 mg Oral Q4H PRN    levothyroxine (SYNTHROID) tablet 100 mcg  100 mcg Oral ACB    ondansetron (ZOFRAN) injection 4 mg  4 mg IntraVENous Q4H PRN    acetaminophen (TYLENOL) tablet 650 mg  650 mg Oral Q4H PRN    aztreonam (AZACTAM) 2 g in 0.9% sodium chloride (MBP/ADV) 100 mL  2 g IntraVENous Q8H    Vancomycin pharmacy to dose  1 Each Other Rx Dosing/Monitoring          Objective:     Patient Vitals for the past 8 hrs:   BP Temp Pulse Resp SpO2 Weight   03/21/17 0417 145/80 98.9 °F (37.2 °C) (!) 107 22 95 % 152 lb 5.4 oz (69.1 kg)   03/20/17 2343 156/82 99.1 °F (37.3 °C) (!) 116 25 94 % -        03/19 1901 - 03/21 0700  In: 2200 [P.O.:480; I.V.:1720]  Out: 1540 [Urine:1540]    Physical Exam: NAD. A&O. Neck -- Supple. No JVD. Heart -- RR. Rate 110's. Lungs -- CTA. Abd -- Benign. Ext -- No LE edema, b/l. Data Review   Recent Results (from the past 24 hour(s))   VANCOMYCIN, TROUGH    Collection Time: 03/20/17  4:25 PM   Result Value Ref Range    Vancomycin,trough 6.4 5.0 - 10.0 ug/mL    Reported dose date: NOT PROVIDED      Reported dose time: NOT PROVIDED      Reported dose: NOT PROVIDED UNITS   CBC W/O DIFF    Collection Time: 03/21/17  5:31 AM   Result Value Ref Range    WBC 0.1 (LL) 3.6 - 11.0 K/uL    RBC 2.28 (L) 3.80 - 5.20 M/uL    HGB 6.7 (L) 11.5 - 16.0 g/dL    HCT 19.5 (L) 35.0 - 47.0 %    MCV 85.5 80.0 - 99.0 FL    MCH 29.4 26.0 - 34.0 PG    MCHC 34.4 30.0 - 36.5 g/dL    RDW 15.3 (H) 11.5 - 14.5 %    PLATELET 5 (LL) 862 - 938 K/uL   METABOLIC PANEL, BASIC    Collection Time: 03/21/17  5:31 AM   Result Value Ref Range    Sodium 140 136 - 145 mmol/L    Potassium 4.0 3.5 - 5.1 mmol/L    Chloride 110 (H) 97 - 108 mmol/L    CO2 22 21 - 32 mmol/L    Anion gap 8 5 - 15 mmol/L    Glucose 96 65 - 100 mg/dL    BUN 33 (H) 6 - 20 MG/DL    Creatinine 0.72 0.55 - 1.02 MG/DL    BUN/Creatinine ratio 46 (H) 12 - 20      GFR est AA >60 >60 ml/min/1.73m2    GFR est non-AA >60 >60 ml/min/1.73m2    Calcium 8.2 (L) 8.5 - 10.1 MG/DL           Assessment:     Principal Problem:    Sepsis (HCC) -- ? PNA      Active Problems:    HTN (hypertension) ()      AML (acute myeloblastic leukemia) (HCC) ()      Neutropenic fever ([facility] [address] (3/30/2016)      Anemia (11/11/2016)      Thrombocytopenia ([facility] [address] (3/20/2017)      Sinus tachycardia (3/20/2017)        Plan:     1. On IV Aztreonam & IV Vanc & Levaquin. 2. Mgmt of Pancytopenia & AML per Dr. Latoya Knight. 3. Cont gentle IVF. Prognosis is guarded. .... I think it is reasonable to cont current care for now, giving her bone marrow a chance to \"kick in\". ...    However, if no improvement soon, may need to consider comfort care.      Left VM for son, Amy Amaral -- cell, Janine Lord MD              Addendum (for coding/billing purposes):    =>Pancytopenia due to chemotherapy in the setting of AML requiring PRBC's and platelets

## 2017-03-21 NOTE — PROGRESS NOTES
Chief Complaint - follow-up and management of  AML   Exam  Gen Moderate distress; lethargic;responds some to voice and touch   HEENT No mucositis; no thrush   Nodes No supraclavicular or cervical adenopathy   Chest / line sites No inflammation   Lungs Clear to auscultation   CV RRR   Abd Non-tender   Ext No edema   Skin No rashes   Neuro  lethargic; weak   Other    Time-based care: [] 25-35 min    [x] > 35 mi     (Total time with >50% spent counseling/coordination)  Discussed with the following:  []     []  Nursing Staff  [x] Consultant    [x]  Family   []  Other:  Active Medical Problems  Acute myelogenous leukemia, 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 125 mcg per day. She could use a little fluid, but she's hypoxic and has pneumonia - need to worry about pulmonary edema - with afib and RVR also. Will give blood and platelets and she'll get some volume from that   Will try to switch from G-CSF to GM-CSF  Febrile again, despite broad-spectrum antibiotics  Continue supportive care;agree with low dose morphine drip for comfort  D/w  and son       CT abd/pelvis  IMPRESSION:     1. Dense right lower lobe airspace disease. 2. No nephrolithiasis or hydronephrosis. 3. Pelvic cyst/cysts as above. US abd  Gallbladder sludge with trace pericholecystic fluid. No biliary ductal  dilatation. No evidence of acute cholecystitis. Chest Xray  IMPRESSION:  Persistent right basilar airspace disease.        Current medications, progress notes, vital signs, radiology, and labs reviewed. Disease Features  30% blasts in marrow at Dx. 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 MRSA. Fatigue  Pancytopenia due to treatment  Pancytopenia, due to disease  Erythematous nodules to both thighs, resolved with abx  Subcutaneous painful nodules in lower legs  red cell antibodies  DVT 2016, anticoagulation on hold     Other Medical Problems  Atrial fibrillation. Hearing loss. Hypothyroidism. Osteoarthritis. Reflux/Heartburn. Colonoscopy in . Cataract removal in . Tonsillectomy in 1939.    Post-menopause at 49. OCP's x 8 years; post-menopause hormones x 10 years. Rash and nausea from subcutaneous Vidaza      Interim History and Assessment  Admission 3/17/17  Admitted yesterday with feeling poorly She has her head down on the pillow and her temperature is 102 and she feels really weak and lousy. She does not have any localizing symptoms of mouth sores, abdominal pain, cough or chest pain, skin lesions, or urinary infection symptoms. She's been under treatment for her AML with low-dose araC and the new agent venetoclax. It's been about 2 weeks since the low-dose araC and she was due to start again next Monday but clearly we won't be doing that. Her absolute neutrophil count is 0 and her white cell count is 0.3.   Admitted for V fluids and IV antibiotics  Elevated bili-abd US negative, bili better  abd pain- CT abd /pelvis showed some pelvic cysts, consider GYN consult next week  BLD cx shows Gm + cocci in pairs and chains, will see what speciation shows, CXR shows possible pneumonia- Cont Vanc and aztreonam  HBG down- transfuse PRBC  PLTs down- transfuse PLTs  Hypokalemia-replete      Family History: Mother  at age 76; history of lung cancer (was a smoker). Father  at the age of 64; history of alcoholism, heart disease, and liver disease. 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 Dating; happy. ROS  The ROS is positive for: fatigue, fever, weakness  Pertinent negatives include: No pulmonary, abdominal, , or cutaneous symptoms; no mucosiits  Except as noted above or in the interim history, a 10 point ROS is negative    Vital Signs  Vitals on 3/17/2017 1:27:00 PM: Height=63.5in, Weight=146.8lb, Temp=102.7f, FUMXI=575, CSZB=80, ONCNYRAXBO=383, XXWFKITOSWM=08, Pulse QT=31%    Exam  ECOG Scale 3: Capable of only limited self care, confined to bed or chair more than 50% of waking hours General moderate distress. Looks frail. Normal respiratory effort  HEENT Anicteric. Conjunctiva + lids without inflammation. Oral No mucositis, no thrush  Lungs Clear to auscultation. CV Regular rhythm and rate. .  Abd Soft and non-tender without masses. No HSM. Extremities No edema or tenderness. Skin No significant rash. Good turgor  Neuro CN's II - XII grossly normal.  Speech NL  Cognition NL  Affect NL    Labs  3/13/17 WBC=.5 ANC=.1 HGB=7.8 MCV=89.3 Plat=10  16 INR=1.1  3/13/17 Sodium=136.5 Potassium=3.6 QZQ=339 Glucose=104 Creat=0.73 AST=13.7 ALT=11.5 Total Bili=2.7 Alk Phos=89.9 Calcium=8.6 eGFR, -American=109.6 eGFR, Non-=81.3    Lab Results Table  Lab results for 3/17/2017   Result Value Units Range Comment   WBC 0.3 K/uL 3.4-10.8 L=   RBC 3.2 M/uL 3.8-5.3    HGB 9.1 g/dL 11.1-15.9    HCT 27.5 % 34-46. 6    MCV 86.2 fL 79-97    MCH 28.4 pg 26.6-33    MCHC 33.0 g/dL 31.5-35.7    Plat 13 K/uL 150-379    ANC 0.0 K/uL 1.4-7    Lymph# 0.3 K/uL 0.7-3.1    EOS# 0.0 K/uL 0-0.4    Lymph% 89.1 % 14-46    EOS% 0.2 % 0-5    RDW 14.6 % 12.3-15.4          Hematology/Oncology Summary    8/5/13 - B12 of 497, CORRINE - negative. 10/7/13 - TSH 0.095, Creat 0.7  10/28/13 - Bone marrow biopsy - AML with 30% blasts; normal chromosomes; negative FISH; negative DNA studies for FLT; negative studies for myelodysplastic markers. 5/12/14 - Bone marrow biopsy - Persistent AML, but with the percentage of blasts decreased down to 11-15%. 11/6/14 - Bone marrow biopsy - 15% blasts, normal cytogenetics, FISH negative for MDS changes or high risk genetic changes. 11/10/14 - Doppler of lower extremities - negative for a clot. 5/27/15 - Bone marrow biopsy - residual acute myeloid leukemia (~16% blasts); 25% cellularity; FISH negative for MDS associated changes; 46,XX normal female karyotype   6/1/15 - Duplex U/S of right lower extremity at [facility] - SVT involving the greater saphenous vein from the distal thigh to distal calf   8/11/15 - Peripheral blood flow cytometry - minor circulating myeloblast population detected (0.9% of sample)  11/9/15 - Peripheral blood flow cytometry - minor myeloblast population (0.5%)  02/01/2016 - Peripheral blood flow cytometry - 0.5% detected ( using CD34 and ) without atypia. 3/30/16 - CT Chest @ [facility] - No evidence for infection in the chest.  4/12/16 - Peripheral blood flow cytometry @ [facility] - Increased myeloblasts (16% of sample), consistent with residual acute myeloid leukemia  4/12/16 - Bone Marrow Bx @ [facility] - Recurrent/residual acute myeloid leukemia (with 20-25% blasts; see comment). Compared to the previous bone marrow study there is a mild increase in blast burden (previous study showed 16% blasts). Chromosome analysis pending.    8/24/16 - Acute LLE DVT ( femoral, pop, peroneal v.)  8/25/16 - IVC filter placement at [facility]  9/18/16- Valproic acid level 26 (6-22)  11/18/16- BmBx blasts 30% as at diagnosis 3 years ago    Other Physicians:  Yovana Gill [phone]~SO;  Provider Name Contact Information   Physician Conor Thomas MD    Consulting Provider Siena Prieto MD Fax: [phone], Work: [phone], Fax: [phone], Work: [phone]  Work: [place] [place], [address]   Dermatologist Sonia Smiht M.D.  Fax: [phone], Work: [phone]  Work: [facility], [address]   Dermatologist George France MD Fax: [phone], Work: [phone]  Work: [address] [facility], [address]   Primary Care Provider Siena Prieto MD Fax: [phone], Work: [phone], Fax: [phone], Work: [phone]  Work: [employer]

## 2017-03-21 NOTE — PROGRESS NOTES
0000 - Bedside and Verbal shift change report given to sasha paniagua (oncoming nurse) by Marie Wood (offgoing nurse). Report included the following information SBAR, Kardex, Intake/Output, MAR, Recent Results and [address] - Dr. Valencia Life on unit. Informed him of patients am critical labs. No new orders. Informed RN to page Dr. Rosado December - Dr. Alexander Godoy paged at this time. 6261 - Bedside and Verbal shift change report given to brain gonzalez (oncoming nurse) by Case Marie (offgoing nurse).  Report included the following information SBAR, Kardex, Intake/Output, MAR, Recent Results and Cardiac Rhythm ST.

## 2017-03-21 NOTE — PROGRESS NOTES
Pt went into SVT with . Candance Huger ... Adenocard given without results. She is DNR, and cardioversion is not in her best interest.  Her prognosis is not good anyway, and I spoke with son, Charles Bryan, and we are in agreement with comfort measures -- start morphine.

## 2017-03-21 NOTE — PROGRESS NOTES
09:25: Entered patient's room to administer medications. 09:28: Patient converted to sustained SVT with a rate in the 200's. Respiratory rate of 38. Assessed patient. 09:30: Rapid Response Team Called. Paged Dr. Gi Bustillos. Patient placed on non-rebreather. EKG performed. 09:35: CCU nurse present. Speaking with patient's son and Dr. Gi Bustillos. Orders received for Adenosine 6mg x 2.  09:40: Patient continues to be in SVT. Orders received for adenosine 12mg. Dr. Gi Bustillos spoke with patient's son. In agreement to move forward with comfort measures. Orders received for 2mg IV PRN morphine and to begin Morphine PCA pump. Patient pain 9/10.  10:00: Morphine PCA started. Patient stated she felt some relief from the pain. Pain reassessed visually 5/10. Will continue to monitor. Patient encouraged to press PCA button. Family instructed only patient can press PCA button. Family is encouraged to call if patient seems to remain in pain.

## 2017-03-21 NOTE — PROGRESS NOTES
Pt's HR still in the 200's -- rhythm strip indicates rapid afib. Pt seen, and does not appear extremely uncomfortable (Morphine PCA running). In order to help her comfort, will start a dilt gtt to help control the rate. I spoke with Dr. Glenn Cruz -- for now, we are in agreement with continuing the abx -- still giving her marrow a chance to kick-in. However, her prognosis is not good, and family is aware of this. I spoke with son, Dominique Duke, present.

## 2017-03-21 NOTE — PROGRESS NOTES
Dr. Benson Doing ordered blood products. The only problem is that pt would require a central line for this. I spoke with son, Krystina Mosley, a couple of hours ago -- We agree with not putting pt through a C-line for this. Can try to give abx as able through peripheral IV's. ... Prognosis is poor, and family is aware.

## 2017-03-21 NOTE — PROGRESS NOTES
1400: Pt temp 102.2. Tylenol administered. 1600: Pt temp improved to 100.7. Pt stated pain is much better. Sinus Tach 180, /66 at cardizem gtt 2.5 mg/hr. Increasing rate to 5mg/hr. Will monitor. 1615: Called Dr Carey's office to ask for clarification on orders to transfuse blood products. [place] said she would transfer to nurse, but line transferred to stated they were not checking messages. Called back but unable to get through. Spoke with patient's family, and son understood that they were moving forward with comfort measures. Paged Dr Delano Naidu for clarification. 1625: Spoke with Dr Shalonda Carrasco regarding concerns and ask for clarification. If pt will need to continue with all current and new orders, per charge nurse, a central line will be needed and to be transferred to ICU. Dr Delano Naidu spoke with patient's son Montserrat Wilder) and advised to not move forward with blood products at this time. Advised if team are able to get a 3rd IV line that antibiotics can be administered, but if unable, Dr Delano Naidu stated to disregard antibiotics at this time. 1645: Pt Sinus Tach 112, /58. Continuing cardizem gtt at 5mg/hr    1700: Bedside and Verbal shift change report given to Violeta Murphy RN (oncoming nurse) by Lynn Moreland RN (offgoing nurse). Report included the following information SBAR, Kardex, Intake/Output, MAR, Accordion, Recent Results, Med Rec Status and Cardiac Rhythm Sinus Tach.     Hourly rounds completed throughout shift

## 2017-03-22 PROBLEM — C95.90 LEUKEMIA (HCC): Status: ACTIVE | Noted: 2017-01-01

## 2017-03-22 NOTE — PROGRESS NOTES
Bedside shift change report given to JOHN Moreira (oncoming nurse) by Bernice/JOHN Rogers (offgoing nurse). Report included the following information SBAR, Kardex, Intake/Output, Accordion and Med Rec Status.

## 2017-03-22 NOTE — PROGRESS NOTES
Hospice consult from Dr. Salvador Segundo noted. Referral for hospice sent via [facility] to [facility] per protocol. Will follow up to make sure they have received the consult. 10:50am-Called hospice to see if they had received the referral and they had not-referral had not been acknowledged in [address] [facility]. Gave them information for them to get referral and send to hospice liaisons.  Maria Dolores Peres RN BS CRM

## 2017-03-22 NOTE — PROGRESS NOTES
Bedside shift change report given to JOHN Moreira (oncoming nurse) by Kathleen Felder RN (offgoing nurse). Report included the following information SBAR, Kardex, MAR and Recent Results.

## 2017-03-22 NOTE — PROGRESS NOTES
Kd Cabello, Vickie Toledo & Mohan    Admit Date: 3/17/2017    Subjective:     Pt sleeping comfortably, and she did not awaken during my exam.  CBC unchanged -- still bad.   Creatinine up to 2.36        Current Facility-Administered Medications   Medication Dose Route Frequency    morphine injection 2 mg  2 mg IntraVENous Q1H PRN    morphine (PF)  mg/30 ml   IntraVENous TITRATE    dilTIAZem (CARDIZEM) 125 mg in dextrose 5% 125 mL infusion  0-15 mg/hr IntraVENous TITRATE    digoxin (LANOXIN) injection 250 mcg  250 mcg IntraVENous Q6H    [START ON 3/23/2017] digoxin (LANOXIN) injection 125 mcg  125 mcg IntraVENous DAILY    0.9% sodium chloride infusion 250 mL  250 mL IntraVENous PRN    0.9% sodium chloride with KCl 20 mEq/L infusion   IntraVENous CONTINUOUS    fluconazole (DIFLUCAN) 200mg/100 mL IVPB (premix)  200 mg IntraVENous Q24H    tbo-filgrastim (GRANIX) injection 300 mcg  300 mcg SubCUTAneous DAILY    levoFLOXacin (LEVAQUIN) 750 mg in D5W IVPB  750 mg IntraVENous Q24H    vancomycin (VANCOCIN) 1,000 mg in 0.9% sodium chloride (MBP/ADV) 250 mL  1,000 mg IntraVENous Q12H    sodium chloride (NS) flush 5-10 mL  5-10 mL IntraVENous PRN    0.9% sodium chloride infusion 250 mL  250 mL IntraVENous PRN    diphenhydrAMINE (BENADRYL) capsule 25 mg  25 mg Oral PRN    docusate sodium (COLACE) capsule 100 mg  100 mg Oral DAILY    polyethylene glycol (MIRALAX) packet 17 g  17 g Oral DAILY    oxyCODONE IR (ROXICODONE) tablet 5 mg  5 mg Oral Q6H PRN    phenyleph-shark luis oil-mo-pet (PREPARATION H) ointment   PeriANAL QID PRN    LORazepam (ATIVAN) tablet 1 mg  1 mg Oral Q4H PRN    levothyroxine (SYNTHROID) tablet 100 mcg  100 mcg Oral ACB    ondansetron (ZOFRAN) injection 4 mg  4 mg IntraVENous Q4H PRN    acetaminophen (TYLENOL) tablet 650 mg  650 mg Oral Q4H PRN    aztreonam (AZACTAM) 2 g in 0.9% sodium chloride (MBP/ADV) 100 mL  2 g IntraVENous Q8H    Vancomycin pharmacy to dose  1 Each Other Rx Dosing/Monitoring          Objective:     Patient Vitals for the past 8 hrs:   BP Temp Pulse Resp SpO2   03/22/17 0718 132/62 - 83 - -   03/22/17 0014 126/60 98.3 °F (36.8 °C) (!) 108 18 90 %        03/20 1901 - 03/22 0700  In: 9571 [P.O.:100; I.V.:1559]  Out: 410 [Urine:410]    Physical Exam: NAD. A&O. Neck -- Supple. No JVD. Heart -- RR. Rate 110's. Lungs -- CTA. Abd -- Benign. Ext -- No LE edema, b/l. Data Review   Recent Results (from the past 24 hour(s))   PLATELETS, ALLOCATE    Collection Time: 03/21/17  4:00 PM   Result Value Ref Range    Unit number [id number]     Blood component type PLTPH LRIR,1     Unit division 00     Status of unit REL FROM [facility]    CBC W/O DIFF    Collection Time: 03/22/17  7:16 AM   Result Value Ref Range    WBC 0.2 (LL) 3.6 - 11.0 K/uL    RBC 2.20 (L) 3.80 - 5.20 M/uL    HGB 6.4 (L) 11.5 - 16.0 g/dL    HCT 19.2 (L) 35.0 - 47.0 %    MCV 87.3 80.0 - 99.0 FL    MCH 29.1 26.0 - 34.0 PG    MCHC 33.3 30.0 - 36.5 g/dL    RDW 16.1 (H) 11.5 - 14.5 %    PLATELET 6 (LL) 180 - 204 K/uL   METABOLIC PANEL, BASIC    Collection Time: 03/22/17  7:16 AM   Result Value Ref Range    Sodium 141 136 - 145 mmol/L    Potassium 4.4 3.5 - 5.1 mmol/L    Chloride 111 (H) 97 - 108 mmol/L    CO2 21 21 - 32 mmol/L    Anion gap 9 5 - 15 mmol/L    Glucose 132 (H) 65 - 100 mg/dL    BUN 60 (H) 6 - 20 MG/DL    Creatinine 2.36 (H) 0.55 - 1.02 MG/DL    BUN/Creatinine ratio 25 (H) 12 - 20      GFR est AA 24 (L) >60 ml/min/1.73m2    GFR est non-AA 20 (L) >60 ml/min/1.73m2    Calcium 8.4 (L) 8.5 - 10.1 MG/DL           Assessment:     Principal Problem:    Sepsis (HCC) -- ? PNA      Active Problems:    HTN (hypertension) ()      AML (acute myeloblastic leukemia) (HCC) ()      Neutropenic fever ([facility] [address] (3/30/2016)      Anemia (11/11/2016)      Thrombocytopenia ([facility] [address] (3/20/2017)      Sinus tachycardia (3/20/2017)      ARF        Plan:     I had conversation with sonPetty -- wishes are for comfort care, and he is in agreement with Hospice care -- will consult hospice, and I think pt is appropriate for inpatient hospice, as I expect her to die in a matter of days.       D/w sonPetty -- cell, [phone]        Rupal Crocker MD

## 2017-03-22 NOTE — PROGRESS NOTES
Heme/Onc f/u  Events noted  Discussed with sons at bedside and with patient  Currently comfortable, NAD  Questions addressed  Plan for hospice noted, please call if we may be of further asssistance

## 2017-03-22 NOTE — HOSPICE
[facility]  [facility]  [phone] [facility] MSW Note:  Hospice consult noted, Chart reviewed, Plan of care to be reviewed with Care manager. Will be meeting with son between 3 and 3:30 today    Thank you for the opportunity to be of service to this patient.     [address] [place] [address]  [phone]

## 2017-03-22 NOTE — HOSPICE
[facility] Those in [facility]  [phone]    Patient Name: Cehyenne Doe  YOB: 1935  Age: 81 [facility]. [facility]  Hospice consult noted. Chart reviewed. Plan of care discussed with patients nurse & care manager. In to meet with pt's son Yasmani/SALTY and wife Autumn Wadsworth. Discussed Hospice philosophy, general plan of care, levels of care, services and on call procedures. Information packet provided & reviewed with family. Family has elected hospice inpatient for GIP management of dyspnea, pain. They wish Dr. Shalonda Carrasco to follow as attending. Telephone call to his office and spoke with nurse on call. Will need to call back tomorrow as they are gone for the day. Call to Dr. Mauro Holliday nurse  Prachi Parra. Orders received for IV meds, increasing Morphine PCA to 2mg basal and d/c bolus. Cardizem drip to remain until tomorrow. Thank you for the opportunity to be of service to this patient.     Keith Medley RN

## 2017-03-22 NOTE — HOSPICE
[facility]  [phone]    Inpatient Nursing Admission   Patient Name: Malorie Welch  YOB: 1935  Age: 81 y.o. Date of Hospice Admission: 3/22/2017  Hospice Attending: Viral Elizondo MD  Primary Care Physician: Viral Elizondo MD  Admitting RN: Evy Huber RN  : ADAIR Guthrie    Level of Care (GIP/Routine/Respite): GIP  Facility of Care: [facility]  Patient Room: 406/01     HOSPICE SUMMARY   ER Visits/ Hospitalizations in past year:   Hospice Diagnosis: leukemia and sepsis  Leukemia ([address]  Onset Date of Hospice Diagnosis: 2013    Co-Morbidities:   Patient Active Problem List   Diagnosis Code    Thyroid activity decreased E03.9    HTN (hypertension) I10    AML (acute myeloblastic leukemia) ([address] C92.00    Neutropenic fever ([address] D70.9, R50.81    Neutropenia ([address] D70.9    Leg DVT (deep venous thromboembolism), acute ([address] I82.409    Anemia D64.9    Sepsis ([address] A41.9    Thrombocytopenia (HCC) D69.6    Sinus tachycardia R00.0    Leukemia ([address] C95.90     Diagnoses RELATED to the terminal prognosis: Leukemia, pancytopenia, sepsis, pneumonia  Other Diagnoses: Acute renal failure, anemia, tachycardia, malnutrition    Rationale for a prognosis of life expectancy of 6 months or less if the disease follows its normal course (Disease Specific History):     Tosin Wetzel is a 81 y.o. who was admitted to [address] [place] [address]. The patient's principle diagnosis of leukemia with pancytopenia has resulted rapid decline. Pt drove self to MD appointment Friday 3/17 and found to be febrile with profound weakness sent by ambulance and admitted to [facility]. Labs showed anemia, thrombocytopenia and blood culture drawn. Pt started on 3 ABT via IV, given blood products and gentle IVF.  On 3/21 a Rapid Response was called for SVT and severe respiratory distress, pt was placed on non-rebreather, and pt received Adenosine 6mg x 2, with no relief then Adenosine 12mg. Pt then placed on Cardizem drip and a Morphine PCA for back pain. Pt continues to decline and has continued somnolence, dyspnea, pain, debility. Functionally, the patient's Palliative Performance Scale has declined over a period of 5 days and is estimated at 20%. Objective information that support this patients limited prognosis includes:     CXR 3/19/17:  IMPRESSION: Progressive volume loss and infiltrate/possible pneumonia in the  right lower chest. New right pleural effusion. The patient/family chose comfort measures with the support of Hospice.     Prognosis estimated based on 03/22/17 clinical assessment is:   [] Few to Many Hours  [x] Hours to Days   [] Few to Many Days   [] Days to Weeks   [] Few to Many Weeks   [] Weeks to Months   [] Few to Many Months    ADVANCE CARE PLANNING (Complete in ACP Flow Sheet)   Code Status: dNR  Durable DNR: [x]  Yes  []  No  Advance Care Planning 3/17/2017   Patient's Healthcare Decision Maker is: Legal Next of Ileana 70   Primary Decision Maker Name Yue Daugherty   Primary Decision Maker Phone Number [phone]   Primary Decision Maker Relationship to Patient Adult child   Secondary Decision Maker Name -   Secondary Decision [address] [place] [address] Ave Phone Number -   Secondary Decision Maker Relationship to Patient -   Confirm Advance Directive Yes, not on file   Patient Would Like to Complete Advance Directive -   Does the patient have other document types -        Service: [] Yes  [x]  No      [] Unknown  Appropriate for Pinning Ceremony:  [] Yes     [] No  Buddhism: Scientology    ASSESSMENT    Quality Measure: Patient self-reports:  []  Yes    [x] No    SYMPTOMS: dyspnea, nonverbal pain, hematuria, unresponsiveness    ESAS: _  Time of Assessment: 1630  Pain (1-10):5  Shortness of breath (1-10):8  Nausea (1-10): 0  Constipation: []  Yes [x] No    FALL RISK:  [x] Low   [] Moderate    [] High    [] Not able to assess    KARNOFSKY: 20%    PRESSURE ULCERS   Luis Scale (pressure ulcer risk): 9    FAST for all dementia:     Progression to DEPENDENCE WITH ADLs (include time frame): since 3/17/17  [x] Dependent for bathing: personal hygiene and grooming   [x] Dependent for dressing: dressing and undressing   [x] Dependent for transferring: movement and mobility   [x] Dependent for toileting: continence-related tasks including control and hygiene   [x] Dependent for eating: preparing food and feeding    CLINICAL INFORMATION   Mid-arm Circumference (MAC):        Wt Readings from Last 3 Encounters:   03/21/17 69.1 kg (152 lb 5.4 oz)   08/25/16 63.5 kg (140 lb)   04/02/16 63.1 kg (139 lb 1.8 oz)     There is no height or weight on file to calculate BMI. There were no vitals taken for this visit. Currently this patient has:  [x] Supplemental O2 [x] Peripheral IV  [] PICC    [] PORT   [x] Lima Catheter [] NG Tube   [] PEG Tube [] Ostomy    [] AICD: Has ICD been deactivated? [] Yes [] No:______    SIGNS/PHYSICAL FINDINGS: Pt is minimally responsive to physical stimuli only, grimacing and moaning. Breathing labored on 6L O2 NC, RR-24, using accessory muscles, lung sound diminished. P-IV bilateral forearms, HRR, abd soft, distended, Lima catheter draining bloody urine. Generalized 2+ edema. Bowel sounds hypo. Last BM 3/19 which was maroon. Pedal pulses faint. Skin warm, pale, scattered bruising.         LAB VALUES    Recent Results (from the past 24 hour(s))   CBC W/O DIFF    Collection Time: 03/22/17  7:16 AM   Result Value Ref Range    WBC 0.2 (LL) 3.6 - 11.0 K/uL    RBC 2.20 (L) 3.80 - 5.20 M/uL    HGB 6.4 (L) 11.5 - 16.0 g/dL    HCT 19.2 (L) 35.0 - 47.0 %    MCV 87.3 80.0 - 99.0 FL    MCH 29.1 26.0 - 34.0 PG    MCHC 33.3 30.0 - 36.5 g/dL    RDW 16.1 (H) 11.5 - 14.5 %    PLATELET 6 (LL) 972 - 710 K/uL   METABOLIC PANEL, BASIC    Collection Time: 03/22/17  7:16 AM   Result Value Ref Range    Sodium 141 136 - 145 mmol/L    Potassium 4.4 3.5 - 5.1 mmol/L    Chloride 111 (H) 97 - 108 mmol/L    CO2 21 21 - 32 mmol/L    Anion gap 9 5 - 15 mmol/L    Glucose 132 (H) 65 - 100 mg/dL    BUN 60 (H) 6 - 20 MG/DL    Creatinine 2.36 (H) 0.55 - 1.02 MG/DL    BUN/Creatinine ratio 25 (H) 12 - 20      GFR est AA 24 (L) >60 ml/min/1.73m2    GFR est non-AA 20 (L) >60 ml/min/1.73m2    Calcium 8.4 (L) 8.5 - 10.1 MG/DL     Lab Results   Component Value Date/Time    Protein, total 5.4 03/20/2017 03:09 AM    Albumin 1.8 03/20/2017 03:09 AM       ALLERGIES AND MEDICATIONS     Allergies: Allergies   Allergen Reactions    Cephalexin Rash    Pcn [Penicillins] Rash     Current Facility-Administered Medications   Medication Dose Route Frequency    LORazepam (ATIVAN) injection 1 mg  1 mg IntraVENous Q15MIN PRN    ketorolac (TORADOL) injection 30 mg  30 mg IntraVENous Q8H PRN    glycopyrrolate (ROBINUL) injection 0.2 mg  0.2 mg IntraVENous Q4H PRN    bisacodyl (DULCOLAX) suppository 10 mg  10 mg Rectal DAILY PRN    morphine injection 2 mg  2 mg IntraVENous Q15MIN PRN    morphine (PF)  mg/30 ml   IntraVENous CONTINUOUS    acetaminophen (TYLENOL) suppository 650 mg  650 mg Rectal Q4H PRN       ASSESSMENT & PLAN     1. Admit to inpatient GIP. 2. Nonverbal pain AEB moaning, grimacing with turns and repostioning. Increase Morphine PCA to 2mg/hr basal and d/c bolus as pt unable to push button. 3. Dyspnea. R-24 using accessory muscles, continue O2 6L NC, give PRN Morphine, Ativan. 4.  Bleeding precautions. Bloody stool, hematuria. 5. Infectious Fever. Toradol and Acetaminophen PRN. 6. Monitor pulse. Tachycardic event in 200s requiring aggressive treatment. 7. Educate and support family. End of life education during pt's rapid decline. 8. Disposition. Remain inpatient for symptom management, then consider [facility]. CAREGIVER SUPPORT:  [x] Provided information on End of Life Care   [x] Material Provided: Zane Bertrand From [facility] or [facility]     1.  FALL INTERVENTIONS PROVIDED (if the fall risk is >10,  the intervention below was provided)  [] Implement/recommend assistive devices and encouraged their use (use full bed rails, etc)  [x] Implemented/recommended resources for alarm, system (personal alarm, bed alarm, call bell, etc)  [] Implemented/recommended environmental changes (remove hazards, lower bed, improper lighting, etc.)  [] Implemented/recommended increased supervision/assistance  [] Scheduled PT and/or OT evaluation for mobility/transfer techniques and/or assistive device recommendations    2. Initial Bereavement POC  [x] LOW RISK Indications: normal grief, good support, opens expression. [] MODERATE RISK Indications: grief normal but severe, depression, somatic distress, low support  [] HIGH RISK indications: Hx of mental illness, suicidal ideation, depression, somatic distress, excessive guilt or anger, multiple losses, other life crisis. 3. Oxygen SAFETY and Anticoagulation SAFETY: Only if being transferred to home environment    4. Copies of Election of Benefit and Hospice Consent:  [x] Hospice Folder Provided  [x] See Electronic Health Records for past medical records    5. Spiritual Issues Identified:  Judaism beliefs, did not have [employer] family    6.  Psych/ Social/ Emotional Issues Identified: Pt was retired teacher, two sons & DILs, grandsons are teenagers and may need early bereavement. 7.  Care Coordination:   Dr. Alex Littlejohn contacted for medication reconciliation, hospice services and treatment  MEDS: See medication list above  DME: Per hospital/hospice house  Supplies: Per hospital/hospice house   [facility]    9. Hospice Team Orders  [x] Skilled Nurse -  Daily  [x] MSW - 1 visit and then prn for assessment/evaluation for family support and need for volunteer services  [x]  - 1 visit and then prn for assessment/evaluation for spiritual support.   services will contact / Clinical Manager for further orders  [] Hospice Aide to be evaluated by  (if patient being transferred to home environment)

## 2017-03-22 NOTE — HOSPICE
[facility]  [phone]    Social Work Admission Note  Patient Name: Ryley Kohler  YOB: 1935  Age: 81 y.o. Date of Visit: 17  Facility of Care: [facility] [place]   Patient Room: [facility]/     Hospice Attending: Delmy Ch MD  Hospice Diagnosis: leukemia and sepsis  Leukemia ([facility] [address]    Level of Care:    [x]  GIP    []  Respite   []  Routine    NARRATIVE   This MSW and Chun Sullivan RN met with patient, who is minimally responsive, her son Marisela Faustin, and his wife Anika Douglas was on speaker phone. Younger son Raghav Dasilva, was in a meeting and was unable to attend. Patient was admitted 3/2017 from her home MDs office. Patient is an 81year old cf with a diagnosis of leukemia, with comorbid A-fib and per family anxiety. Patient was diagnosed with leukemia 4 years ago. Patient is single, Mitchell Chapa reported his father  suddenly of a stroke in the 46s. Patient is a retired . She was born in [place], and lived in multiple states. The family reports she grew up in a family with contention and made pee her children got along. They describe her as an independent person with boundless energy. She loved doing crafts and had projects she worked on throughout her home. Patient wrote a book about summer houses and even took the photographs used in the book. Photography is one of her hobbies. Son is coping adaptively, through tears and support from his family. He and his brother are very close. Son and wife are concerned about their 2 sons ages 23 and 15. Per son, 23year old son is at senior at [employer] and is prone to depression and is on an anti-depressant. MSW shared information on grief and talking to teens as well as contact information for the [address]. MSW will continue to provided support and resources for the family in developing a plan to talk to the children. MSW shared GFMS. Patient is Jessi.  The family will be using [facility] on [address]. Low risk for son. ADVANCE CARE PLANNING    Code Status: DDNR   Durable DNR: Orpah Nissen  _ No  Advance Care Planning 3/17/2017   Patient's Healthcare Decision Maker is: Legal Next of Ileana 70   Primary Decision Maker Name Anna Noel   Primary Decision Maker Phone Number [phone]   Primary Decision Maker Relationship to Patient Adult child   Secondary Decision Maker Name -   Secondary Decision [address] Phone Number -   Secondary Decision Maker Relationship to Patient -   Confirm Advance Directive Yes, not on file   Patient Would Like to Complete Advance Directive -   Does the patient have other document types -       Relationship Status:  [x]  Single     []        []      []  Domestic Partner     []  /  []  Common Law  []    []  Unknown    If in a relationship, name of partner/spouse:  Duration of relationship:    Hoahaoism: Worship     Home: [facility]  Resources Provided: [employer] [facility] Work Initial Assessment     Gender:  female    Race/Ethnicity: (ranjith all that apply)  []  American [place] [facility]) or [place] Native  []  [address]  []  Black or [place] American  []   or   []  Critical access hospital[facility] or [facility]  [x]  White  []  Unknown      Service:    []  Yes   [x]  No       []  Unknown  Appropriate for Pinning Ceremony:   []  Yes      []  No  Is patient using VA benefits?    []  Yes      []  No     Primary Language: ENGLISH  []   Needed  []   utilized during visit    Ability to express thoughts/needs/feelings  []  Expressed thoughts/feelings/needs without difficulty  []  Requires extra time and cuing  []  Speech limited single words  []  Uses only gestures (eye, blinking eye or head movement/pointing)  []  Unable to express thoughts/feelings/needs (speech unintelligible or inappropriate)  [x]  MINIMALLY RESPONSIVE   Notes:      Mental Status:  []  Alert-oriented to:     []  Person     []  Place     [] Time  []  Comatose-responds to:    []   Verbal stimuli    []  Tactile stimuli    []  Painful stimuli  []  Forgetful  []  Disoriented/Confused  []  Lethargic  []  Agitated  [x]  Other (specify):  MINIMALLY RESPONSIVE   Notes:      Patients description of Illness/Current Health Status:    [x]  Patient unable to discussMINIMALLY RESPONSIVE   []  Patient unwilling to discuss  [x]  (Specify)        Knowledge/Understanding of Disease Process  Patient:    []  Demonstrates knowledge/understanding of disease process   []  Demonstrates knowledge/understanding of treatment plan   []  Demonstrates knowledge/understanding of prognosis   []  Demonstrates acceptance of prognosis   []  Demonstrates knowledge/understanding of resuscitation status   [x]  Other (specify)MINIMALLY RESPONSIVE   Caregiver:   [x]  Demonstrates knowledge/understanding of disease process   [x]  Demonstrates knowledge/understanding of treatment plan   [x]  Demonstrates knowledge/understanding of prognosis   [x]  Demonstrates acceptance of prognosis   [x]  Demonstrates knowledge/understanding of resuscitation status   []  Other (specify)  Notes:      Patients living arrangement/care setting:  Use the PRIOR COLUMN when the PATIENTS current health status necessitated a change in his/her primary residence.     Prior Current Response              [x]             []    Patients own home/residence              []             []    Home of family member/friend              []             []    Boarding home              []             []    Assisted living facility/[facility]FP center              []             []    Hospital/Acute care facility              []             []    Skilled nursing facility              []             []    Long term care facility/Nursing home              []             [x]    Hospice in Patient      Primary Caregiver:  []  No Primary Caregiver  Name of Primary Caregiver: Ana Garcia  Relationship or Primary Caregiver:    [] Spouse/Significant other       [x]  Natural Child        []  Step child       []  Sibling   []  Parent   []  Friend/Neighbor   []  Community/Taoism Volunteer   []  Paid help   []  Other (specify):___________  Notes:       Family members/Significant others:  Name:KEHINDE Gregory  Relationship:SON  Phone [phone]  Actively involved in care? [x]  Yes  []  No    Name:Júnior MACKEY  Relationship: SON  Phone Number:[phone]  Actively involved in care? [x]  Yes  []  No    Name:FLORA MACKEY   Relationship:DIL  Phone Number:[phone]  Actively involved in care?   [x]  Yes  []  No    Social support systems: (select ONE best description)  [x]  Excellent social support system which includes three or more family members or friends  []  Good social support system which includes two or less members or friends  []  [address] support which includes one family member or friend  []  Poor social support; no family members or friends; basically ALONE  Notes:      Emotional Status: (ranjith all that apply)    Patient Caregiver Response                 [x]                [x]    Mood/Affect stable and appropriate                   []                []    Angry                 []                []    Anxious                 []                []    Apprehensive                 []                []    Avoidant                 []                []    Clinging                 []                []    Depressed                 []                []    Distraught                 []                []    Elated                 []                []    Euphoric                 []                []    Fearful                 []                []    Flat Affect                 []                []    Helpless                 []                []    Hostile                 []                []    Impulsive                 []                []    Irritable                 []                []    Labile                 []                [] Manic                 []                []    Restlessness                 []                []    Sad                 []                []    Suspicious                 []                [x]    Tearful                 []                []    Withdrawn     Notes:     Coping Skills (strengths/weakness):    Patient: Coping Skills (strength/weakness): COULD NOT EXPRESS   Family/caregiver (strength/weakness): STRONG FAMILY SUPPORT, FATHER PASSED, GRANDSON IS PRONE TO DEPRESSION ON DEPRESSION MEDICATION.     Willamina of care (ranjith all that apply):     [x]  No burden evident   []  Family must administer medications   []  Illness causing financial strain   []  Family/Support feels overwhelmed   []  Family/Support sleep disturbed with patients care   []  Patients care causes extra physical stress  of death  []  Illness causes changes in family lifestyle  []  Illness impacting family/support employment  []  Family experiencing increased time demands  []  Patients behavior endangers family  []  Denial of patients illness  []  Concern over outcome of illness/fear  []  Patients behavior embarrassing to family   Notes:      Risk Factors: (ranjith all that apply):    [x]  No burden evident   []  Alcohol abuse  []  Financial resources inadequate to meet basic needs (food/house/etc)  []  Financial resources inadequate to meet health care needs (supplies/equipment/medications)  []  Food/nutrition resources inadequate  []  Home environment unsafe/inadequate for home care  []  Homicidal risk  []  Lives alone or without concerned relatives  []  Multiple medications/complex schedule  []  Physical limitations increase likelihood of falls  []  Plan of care/treatments complicated  []  Substance use/abuse  []  Suicidal risk  []  Visual impairment threatens safety/ability to perform self-care  []  Other (specify):     Abuse/Neglect (actual/potential risks):  [x]  No signs of abuse/neglect  []  History of abuse/neglect                 [] Physical          []  Sexual  []  History of domestic violence  []  Lacks adequate physical care  []  Lacks emotional nurturing/support  []  Lacks appropriate stimulation/cognitive experiences  []  Left alone inappropriately  []  Lacks necessary supervision  []  Inadequate or delayed medical care  []  Unsafe environment (i.e guns/drug use/history of violence in the home/etc.)  []  Bruising or other physical signs of injury present  []  Other (specify):  Notes:   []  Refer to child/adult protective services      Current Sources of Stress (in Addition to Current Illness):   [x]  None reported  []  Bills/Debt    []  Career/Job change    []   (short term)    []   (long term)    []  Death of a child (recent)    []  Death of a parent (recent)   []  Death of a spouse (recent)   []  Employment status changed   []  Family discord    []  Financial loss/Inadequate inther (specify):come  []  Job loss  []  Legal issues unresolved  []  Lifestyle change  []  Marital discord  []  Marriage within the last year  []  Paperwork (insurance/legal/etc) overwhelming  []  Separation/Divorce  []  Other (specify):  Notes:      Current [employer] Being Utilized     1. NONE NOTED        Interventions/Plan of Care     1. Assess social and emotional factors related to coping with end of life issues  2. Community resource planning/referral   3. Relocation to different care setting if/when symptoms stabilize [facility]  4. Discharge Planning     1.  [facility] AT [address] [place] [address] Bereavement Assessment     3/22/2017    Bereaved Name:CHARLI ESPINO  Address: [address] [place], [address] [place] [address]  Phone: [phone]  Bereaved Date of Birth  Bereaved Gender:  []  Female  [x]  Male  Date Assessment Completed: 03/22/17    Relationship to the Patient:  []  Spouse/Significant other    []  Parent  [x]  Natural child      []  Step child   []  friend/Neighbor  []  Sibling  []  Other (specify):     Primary Caregiver  [x]  Yes    []  No     Social Support Systems  [x]  Excellent social support system which includes three or more willing family members or friends  []  Good social support system which includes two or less willing family members or friends  []  [address] support which includes one willing family member or friend  []  Poor social support; no willing family members or friends; basically ALONE     Frequency of Contact/Communication with Family and Friends  [x]  Daily  []  Three or more times a week  []  One to two times a week  []  Two to three times per month  []  At least monthly  []  Less often than monthly     Community Support Groups/Counseling Services Currently Used:   [x]  None  []  Bereavement support group   Specify support group:   []  Behavioral Support group   Specify support group:   []  Cancer support group    Specify support group:   []  Mental health support/counseling  Specify support group:   []  Other (specify)     Sources of Stress/Grief in Addition to Patients Current illness or Death:    []  None reported  []  Bills/Debt    []  Career/Job change     []   (short term)  []   (long term)     []  Death of child      []  Death of parent    []  Death of spouse   []  Employment status changed     []  Family discord     []  Financial      []  Financial loss/Inadequate income  []  Job loss  []  Lifestyle change  []  Marital discord  []  Marriage within the last year  []  Separation/Divorce. []  Legal issues unresolved  []  Paperwork (insurance/legal/etc.) overwhelming  []  Personal illness/injury  [x]  Other (specify):20 Y/O SON HAS DEPRESSION AND IS ON MEDICATION FOR IT.   Stress Level Reported   [] 1      [x] 2      [] 3      [] 4      [] 5     [] 6      [] 7      [] 8      [] 9      [] 10  []  No Stress         []  Maximum Stress     Support Notes:      Emotional Behavior  Emotional Status:    [x]  NO SIGNIFICANT FINDINGS  []  Agitation/Restless []  Angry       []  Anxious     []  Avoidant       []  Bereavement /loss issues     []  Clinging     []  Depressed       []  Distraught  []  Euphoric   []  Fearful   []  Flat affect  []  Helpless  []  Hostile   []  Irritable  []  Labile  []  Potential suicide risk  []  Sad  []  Strained family/social relationships  []  Suspicious  []  Tearful  []  Withdrawn         Coping of Caregiver: Check coping mechanisms observed during assessment  []  Confrontive coping (describes aggressive efforts to alter the situation and suggests some degree of hostility and risk taking)  []  Distancing (describes cognitive efforts to detach oneself and to minimize the significance of the situation)  []  Self-Controlling 9describes efforts to regulate ones own feelings)  []  Social Support (describes efforts to seek informational support, tangible support and emotional support)  [x]  Accepting (acknowledges ones own role an doing inner work that promotes personal peace)  []  Resignation - surrender (to accept no longer fight, to make peace w/circumstances)  []  Escape - Avoidance- Denial (describes wishful thinking and behavioral efforts to escape or avoid)  []  Planful Problem Solving (describes deliberates [id number] efforts to alter the situation)  []  Positive Reappraisal (describes efforts to create positive meaning by focusing on personal growth.  It also has a Moravian dimension)  []  Other:     Significant behavior changed noted:   [x]  None  []  Alcohol use/abuse  []  Arrest or problems with law enforcement  []  Emotional lability  []  Increased incidence of physical illness/symptoms  []  Loss of interest in activities  []  School performance affected   []  Sleeping patters/habits altered  []  Substance/Drug use and/or abuse  []  Smoking (underage or excessive)  []  Truancy  []  Other:      Emotional/Behavior Notes:N/A    []  Suicidal Ideation Expressed/Discussed   []  Homicidal Ideation Expressed/Discussed           Coping skills (strengths/weaknesses): GOOD FAMILY SUPPORT/ TEARFUL/ ACCEPTING, MOTHER WAS GIVEN 18 MOS TO LIVE ASN HAS EXCEEDED HER GIVEN TIME.     Support Services Needed/Referrals Required: Ana Bermeo all that apply)     Suggested Resources  []         []  Financial management/counseling  []  Final arrangements/ planning  []  Food/Nutrition resources  []  Home maintenance/repairs/ services  []  Homemaker services  []  Income/Medical coverage assistance  []  Legal Assistance   []  Mental health referral   []  Protective services  []  Relocation to different care setting  []  Transportation   [x]  Other: INFORMATION ON TALKING TO 19 AND 13 Y/O SONS ABOUT DECLINE OF PT.             Bereavement Follow-Up Plan:         Financial  [x]  Independent: Manages financial affairs without assistance  []  Minimal Assistance: Needs prompting/reminders to pay bills/make deposits/cash checks or manage accounts  []  Moderate Assistance: Needs supervision of all financial tasks  []  Total Assistance: Unable to manage her/his own financial affairs  []  Unknown  Notes     Survivor Risk Assessment Summary (Actual or Potential Risks to the Bereavement Process):     []  Abuse or history of abuse observed or reported within family system  []  Concurrent stressful events or situations observed or reported  []  Dependent children reside within household  []  Family discord exhibited/described  []  Feelings of guilt expressed by family members  []  Financial status significantly affected by illness/death  []  Marital discord exhibited/described  []  Neglect observed or reported within the family system  []  Patient is a single-parent with dependent children  []  Psychiatric illness (or history) reported  []  Social Support systems limited  []  Spiritual distress observed or reported  []  Survivor frail/elderly/dependent  []  Survivor showing emotional and/or physical signs of stress/distress  [x]  Other (specify): 23YEAR OLD SON HAS DEPRESSION  Notes:     Cultural Perspective Regarding Death:    []  Yes    [x]  No  Cultural Notes     Bereavement Assessment:     [x]  LOW RISK Indications:    []  normal grief   []  good support    []  open expression      []  MODERATE RISK Indications:    []  grief normal but severe    []  depression (taking meds. professional help)   []  somatic distress   []  low support    []  Hx of difficulty w/ loss    []  unresolved conflict w/ the .  Dudley Su  []  HIGH RISK Indications:    []  Hx mental illness    []  Suicidal ideation    []  Depression (no meds.  or prof. help)   []  Somatic distress   []  Excessive guilt or anger    []  Multiple losses    []  unresolved losses , other life crises.        MSW Assessment Completed by: Rocio Lee  17    Time In:16:00      Time Out:17:30

## 2017-03-22 NOTE — PROGRESS NOTES
Problem: Discharge Planning  Goal: *Discharge to safe environment  Outcome: Progressing Towards Goal  See care management notes

## 2017-03-23 PROBLEM — N17.9 ACUTE RENAL FAILURE (ARF) (HCC): Status: ACTIVE | Noted: 2017-01-01

## 2017-03-23 PROBLEM — I48.91 ATRIAL FIBRILLATION, RAPID (HCC): Status: ACTIVE | Noted: 2017-01-01

## 2017-03-23 NOTE — H&P
History and Physical    Subjective:     Tosin Wetzel is a 81 y.o. white female who is being transferred to inpatient hospice care. Pt has AML and was admitted here several days ago with sepsis, neutropenic fever, severe pancytopenia. She was treated with broad spectrum abx. She went into rapid afib with 's. This was slowed down with a dilt gtt. In order for pt to continue IV abx & receive blood products, etc, a central line would need to be placed. Pt's family was against this, and their wishes are for comfort care/hospice care. Past Medical History:   Diagnosis Date    AML (acute myeloblastic leukemia) (HCC)     GERD (gastroesophageal reflux disease)     HTN (hypertension)     Thyroid activity decreased      Allergies   Allergen Reactions    Cephalexin Rash    Pcn [Penicillins] Rash     Prior to Admission medications    Medication Sig Start Date End Date Taking? Authorizing Provider   bisacodyl (DULCOLAX, BISACODYL,) 10 mg suppository Insert 10 mg into rectum daily. Give if no BM in 3 days  Indications: Give if no BM in 3 days 3/22/17   Sean Kay MD   OXYGEN-AIR DELIVERY SYSTEMS 6 L/min by Nasal route as needed (to keep O2 sats > 90%). Indications: dyspnea 3/22/17   Sean Kay MD   0.9 % SODIUM CHLORIDE (NORMAL SALINE FLUSH INJECTION) 5 mL by IntraVENous route as needed (line patency). Indications: line patency 3/22/17   Sean Kay MD   MORPHINE PCA 2 mg/hr by IntraVENous route continuous. Indications: pain, dyspnea 3/22/17   Sean Kay MD   morphine 2 mg/mL syrg 2 mg by IntraVENous route every fifteen (15) minutes as needed (pain, dyspnea). Indications: Severe Pain, dyspnea 3/22/17   Sean Kay MD   acetaminophen (TYLENOL) 650 mg suppository Insert 650 mg into rectum every four (4) hours as needed for Fever.  Indications: Fever 3/22/17   Sean Kay MD   glycopyrrolate (ROBINUL) 0.2 mg/mL injection 0.2 mg by IntraVENous route every four (4) hours as needed (secretions). Indications: secretions 3/22/17   Za Day MD   ketorolac (TORADOL) 30 mg/mL (1 mL) injection 30 mg by IntraVENous route every eight (8) hours as needed (fever). Indications: fever 3/22/17   Za Day MD   LORazepam (ATIVAN) 2 mg/mL syrg 1 mg by IntraVENous route every fifteen (15) minutes as needed (anxiety, agitation). Indications: anxiety, agitation 3/22/17   Za Day MD   ondansetron hcl (ZOFRAN, AS HYDROCHLORIDE,) 4 mg tablet Take 4 mg by mouth two (2) times a day. Historical Provider   allopurinol (ZYLOPRIM) 100 mg tablet Take  by mouth daily. Historical Provider   venetoclax (VENCLEXTA) 100 mg tab Take 6 Tabs by mouth daily. Historical Provider   levothyroxine (SYNTHROID) 100 mcg tablet TAKE 1 TABLET BY MOUTH EVERY DAY 8/24/16   Za Day MD   therapeutic multivitamin [facility]) tablet Take 1 Tab by mouth nightly. Historical Provider     Social History   Substance Use Topics    Smoking status: Never Smoker    Smokeless tobacco: Not on file    Alcohol use Yes      Comment: occasional     Family History   Problem Relation Age of Onset    Alcohol abuse Father      ETOH               Review of Systems:  As above. Objective:       Physical Exam:   In NAD. Neck -- Supple. No JVD. Data Review:     Assessment:     Principal Problem:    Leukemia ([facility] [address] (3/22/2017)      Active Problems:    HTN (hypertension) ()      Neutropenic fever (HCC) (3/30/2016)      Atrial fibrillation, rapid ([facility] [address] (3/23/2017)      Acute renal failure      Plan:     Admit to inpatient hospice. Family in agreement with this. I expect her death in a matter of days.         Signed By: Za Day MD     March 23, 2017

## 2017-03-23 NOTE — PROGRESS NOTES
TRANSFER - OUT REPORT:    Verbal report given to [address] [place] [address] Street, RN(name) on Tosin Wetzel  being transferred to (unit) for change in patient condition(hospice)       Report consisted of patients Situation, Background, Assessment and   Recommendations(SBAR). Information from the following report(s) SBAR, Kardex and Med Rec Status was reviewed with the receiving nurse. Lines:   Peripheral IV 03/18/17 Right Forearm (Active)   Site Assessment Clean, dry, & intact 3/23/2017  8:15 AM   Phlebitis Assessment 0 3/23/2017  8:15 AM   Infiltration Assessment 0 3/23/2017  8:15 AM   Dressing Status Clean, dry, & intact 3/23/2017  8:15 AM   Dressing Type Transparent 3/23/2017  8:15 AM   Hub Color/Line Status Infusing;Blue 3/23/2017  8:15 AM   Action Taken Open ports on tubing capped 3/22/2017  2:30 PM   Alcohol Cap Used Yes 3/23/2017  8:15 AM       Peripheral IV 03/21/17 Left Arm (Active)   Site Assessment Clean, dry, & intact 3/23/2017  8:15 AM   Phlebitis Assessment 0 3/23/2017  8:15 AM   Infiltration Assessment 0 3/23/2017  8:15 AM   Dressing Status Clean, dry, & intact 3/23/2017  8:15 AM   Dressing Type Transparent 3/23/2017  8:15 AM   Hub Color/Line Status Blue 3/23/2017  8:15 AM   Action Taken Open ports on tubing capped 3/22/2017  2:30 PM   Alcohol Cap Used Yes 3/23/2017  8:15 AM        Opportunity for questions and clarification was provided.       Patient transported with:   O2 @ 6 liters  Registered Nurse

## 2017-03-23 NOTE — PROGRESS NOTES
Bedside shift change report given to [facility] LAMIN (oncoming nurse) by Janine Gallegos (offgoing nurse). Report included the following information SBAR.

## 2017-03-23 NOTE — PROGRESS NOTES
TRANSFER - IN REPORT:    Verbal report received from [employer] (name) on Tosin Wetzel  being received from [facility] (unit) for routine progression of care      Report consisted of patients Situation, Background, Assessment and   Recommendations(SBAR). Information from the following report(s) SBAR, Kardex, Intake/Output, Recent Results and Med Rec Status was reviewed with the receiving nurse. Opportunity for questions and clarification was provided. Assessment completed upon patients arrival to unit and care assumed.

## 2017-03-23 NOTE — PROGRESS NOTES
Music Therapy Assessment    Mia Li [id number]  [id number]    1935  81 y.o.  female    Patient Telephone Number: [phone] (home)   Tenriism Affiliation: Ruchi Romero   Language: English   Extended Emergency Contact Information  Primary Emergency Contact: Kyle Weber Phone: [phone]  Relation: Child   Patient Active Problem List    Diagnosis Date Noted    Leukemia ([facility] 03/22/2017    Thrombocytopenia ([address] 03/20/2017    Sinus tachycardia 03/20/2017    Sepsis ([address] 03/17/2017    Anemia 11/11/2016    Neutropenia ([address] 08/25/2016    Leg DVT (deep venous thromboembolism), acute ([facility] [address] 08/25/2016    Neutropenic fever ([facility] [address] 03/30/2016    AML (acute myeloblastic leukemia) ([facility] [address]     HTN (hypertension)     Thyroid activity decreased         Date: 3/23/2017       Mental Status:   [  ] Alert [  ] Garlon Rival [  ]  Confused  [x] Minimally responsive    Communication Status: [  ] Impaired Speech [  ] Nonverbal     Physical Status:   [  ] Hard of Hearing [  ] Oxygen in use [  ] Ambulatory   [  ] Ambulatory with assistance  [  ] Non-ambulatory -N/A    Music Preferences, Background: Music from the 80s, music from old movies. The patient's son said that recently the pt shared with him that singing in a choir in her young adulthood was one of the happiest experiences of her life. Clinical Problem addressed: Pt/family coping, promote relaxation and feelings of safety and comfort. Goal(s) met in session:  Physical/Pain management (Scale of 1-10):    Pre-session rating: Pt couldn't rate (please see Session Observations below). Post-session rating: Pt couldn't rate.    [  ] Increased relaxation   [  ] Regulated breathing patterns  [  ] Minimized physical distress   [  ] Decreased nausea discomfort     Emotional/Psychological:  [  ] Expression of feelings   [  ] Decreased aggressive behavior   [  ] Decreased sadness   [  ] Increased range of coping skills     [  ] Improved mood    [  ] Decreased withdrawn behavior     Social:  [  ] Decreased feelings of isolation  [  ] Positive social interaction   [x] Provided support and/or comfort for family/friends    Spiritual:  [  ] Spiritual support    [  ] Expressed peace     Techniques Utilized (Check all that apply):   [  ] Procedural support MT [x] Music for relaxation [x] Patient preferred music  [  ] Tracy analysis  [  ] Song choice  [  ] Music for validation  [  ] Music listening  [  ] Progressive relaxation [  ] Guided imagery  [  ] Shauna Laroseg  [  ] TIMP   [  ] Mylene Banks writing  [  ] Roger Gabe along   [  ] Improvisation  [x] Sensory stimulation  [x] Active Listening-pt's son [  ] Music for spiritual support [  ] Making of CDs as gifts    Session Observations:  Referral from Dr. Beth Douglas. This music therapist (MT) spoke with the patient's son Viky Romero over the phone. He expressed gratitude that the patient (pt) would receive music therapy, and shared her music preferences. The pt was observed to be lying comfortably in bed with her eyes closed. The MT gently touched her left shoulder while greeting her. The MT shared about the phone conversation with Viky Romero, shared what the pt could expect to hear next, and invited the pt to relax to the music. The pt's eyes remained closed and she was observed to be minimally responsive throughout the session. The MT sat at bedside and sang and played Love Me Tender, [place], and [employer] with guitar. The MT sang and played each song at a soft dynamic (volume) and moderately slow tempo (rhythm) to promote relaxation. The pt's son Baldomero Alejandro entered and the MT greeted him, introduced self, and shared about the phone conversation with his brother. Baldomero Alejandro said Viky Romero had informed him of the MT coming, and he added that the pt had enjoyed music from movies. The MT sang and played The Sound of Music from the movie musical of the same title.  The pt's son's affect brightened and he increased self-expression in response to the music, as evidenced by (AEB) smiling and expressing enjoyment. He also engaged in sharing memories of the pt's life, and in sharing about his family and his daughter, and how the support and love of his parents has affected his life. The MT provided active listening and words of validation and support. Then the MT concluded the session with the song You'll Never Walk Alone. The pt's son expressed gratitude for the session. The MT shook his hand, and then gently touched the pt's shoulder while saying good bye. Will follow as able.     Bob Felipe MT-BC (Music Therapist-Board Certified)  Spiritual Care Department  Referral-based service

## 2017-03-23 NOTE — PROGRESS NOTES
Pt resting comfortably, and I did not try to awaken her. Continue inpatient Hospice care.   Will d/c telemetry -- Transfer to  would be ideal.    D/w son, Amy Amaral -- cell, [phone]

## 2017-03-23 NOTE — HOSPICE
[facility] Help to Those in Need  [phone]    GIP Daily Nursing Note   Patient Name: Colonel John  YOB: 1935  Age: 81 y.o. Date of Visit: 03/23/17  Facility of Care: [facility]  Patient Room: 216/01     Hospice Attending: Chinedu Smith MD  Hospice Diagnosis: leukemia and sepsis  Leukemia ([facility] [address]    Level of Care: GIP    Current GIP Symptoms    1. Dyspnea, nonverbal pain, hematuria, fever        ASSESSMENT & PLAN   Must update Plan of Care including visit frequencies for IDT members  1. Remain GIP for symptom management. 2. Nonverbal pain AEB moaning, grimacing with repostioning. Increase Morphine PCA to 3mg/hr basal  3. Dyspnea. Using accessory muscles breathing irregular, continue O2 6L NC  4. Coarse lung sounds. Start scopolamine. Robinul PRN  5. Bleeding precautions. Bloody stool, hematuria. Limit repositioning. 6. Infectious Fever. Toradol and Acetaminophen PRN. 7. Educate and support family. Pt is imminent, family holding armstrong. 8. Disposition. Remain inpatient for symptom management.     Spiritual Interventions:  visits as needed    Psych/ Social/ Emotional Interventions: Son Beverly Coronado tearful but coping. Two grandsons may need pre-bereavement. Son Alexandre Cade and wife Fernie Martinez visited. Care Coordination Needs: IDT team    Care plan and New Orders discussed / approved with Dr. Jaspreet Barlow    Description History and Chart Review   If this is initial GIP note must document RN assessment/MD communication in previous setting. Specifically document nursing/medication needs in last 24 hours to support GIP care  Narrative History of last 24 hours that demonstrates care cannot be provided in another setting:  Pt is minimally responsive to physical stimuli, breathing labored on 6L NC, poor air exchange, O2 sats 74-80%. P-IVs bilateral forearms. PCA Morphine 3mg/hr continuous. Heart rate regular but bounding. P-98. Lungs coarse, scleral jaundice, abd distended, firm.  Bowel sounds hypo to absent. Lima draining bloody urine. Edema 2-3+ pitting lower extremities. Pedal pulses +. Skin jaundice and multiple bruising throughout. What has been done to control the patient's symptoms in the last 24 hours? PRNs given for signs of pain    Does the patient currently require IV medications? yes  Does the patient currently require scheduled medications? yes  Does the patient currently require a PCA? yes    List number of doses of PRN medications in last 24 hours: 2  Medication 1: Toradol 30mg  Number of doses: 1    Medication 2:  Ativan 1mg  Number of doses: 1    Medication 3:   Number of doses:    Supporting documentation for GIP need for pain control:  [x] Frequent evaluation by a doctor, nurse practitioner, nurse   [x] Frequent medication adjustment    [x] IVs that cannot be administered at home   [x] Aggressive pain management   [] Complicated technical delivery of medications              Supporting documentation for GIP need for symptom control:  [x]  Sudden decline necessitating intensive nursing intervention  []  Uncontrolled / intractable nausea or vomiting   []  Pathological fractures  []  Advanced open wounds requiring frequent skilled care  [x] Unmanageable respiratory distress  [] New or worsening delirium   [] Delirium with behavior issues: Is 24 hour caregiver present due to safety concerns with agitation? (yes/no)  [x] Imminent death - with skilled nursing needs documented above    DISCHARGE PLANNING   Daily discharge planning required for GIP  1. Discharge Plan: remain inpatient as unsafe to move, imminent  2. Patient/Family teaching: Endstage breathing, continue to speak to pt even if she is unresponsive. 3. Response to patient/family teaching: good understanding and acceptance.     ASSESSMENT    KARNOFSKY: 10%    Prognosis estimated based on 03/23/17 clinical assessment is:   [x] Few to Many Hours  [] Hours to Days   [] Few to Many Days   [] Days to Weeks   [] Few to Many Weeks [] Weeks to Months   [] Few to Many Months    Quality Measure: Patient self-reports:  [] Yes    [x] No    ESAS:   Time of Assessment: 1100  Pain (1-10):5  Fatigue (1-10): 0  Shortness of breath (1-10):8  Nausea (1-10):0  Appetite (1-10):0  Anxiety: (1-10): 0  Depression: (1-10): 0  Well-being: (1-10): 0  Constipation: _ Yes x No  LAST BM: 3/19    CLINICAL INFORMATION   Patient Vitals for the past 12 hrs:   Temp Pulse Resp BP SpO2   03/23/17 0943 (!) 100.8 °F (38.2 °C) 100 22 134/62 (!) 84 %   03/23/17 0801 99.4 °F (37.4 °C) 94 20 94/53 (!) 89 %       Currently this patient has:  [x] Supplemental O2   [x] IV    [] PICC      [] PORT   [] NG Tube    [] PEG Tube   [] Ostomy     [x] Lima draining __bloody_____ urine  [] Other:     SIGNS/PHYSICAL FINDINGS     Skin (including wound):  [] Warm, dry, supple, intact and color normal for race  [x] Warm   [] Dry   [] Cool     [] Clammy       [x] Diaphoretic    Turgor   [] Normal   [x] Decreased  Color:   [] Pink   [] Pale   [] Cyanotic   [] Erythema   [x] Jaundice   [] Normal for Race  []  Wounds:    Neuro:  [x] Lethargy  [] Restlessness / agitation  [] Confusion / delirium  [] Hallucinations  [x] Responds to maximal stimulation  [] Unresponsive  [] Seizures     Cardiac:  [] Dyspnea on Exertion  [] JVD  [] Murmur  [] Palpitations  [] Hypotension  [] Hypertension  [x] Tachycardia  [] Bradycardia  [] Irregular HR  [] Pulses Decreased  [] Pulses Absent  [x] Edema:   2-3+ lower ext, pitting    [] Mottling:      (Location)    Respiratory:  Breath sounds:    [] Diminished   [] Wheeze   [x] Rhonchi   [] Rales   [] Even and unlabored  [x] Labored:    USING ACCESSORY MUSCLES        [] Cough   [] Non Productive   [] Productive    [] Description:           [] Deep suctioned   [x] O2 at __6_ LPM  [] High flow oxygen greater than 10 LPM  [] Bi-Pap    GI  [x] Abdomen distended  [] Ascites  [] Nausea  [] Vomiting  [] Incontinent of bowels  [x] Bowel sounds hypo  [] Diarrhea  [] Constipation (see above including last bowel movement)  [] Checked for impaction  [] Last BM 3/19    Nutrition  Diet:____NPO______  Appetite:   [] Good   [] Fair   [] Poor   [] Tube Feeding       [] Voiding  [] Incontinent   [x] Lima    Musculoskeletal  [] Balance/[place] Unsteady   [x] Weak   Strength:    [] Normal    [] Limited    [] Decreasing   Activities:    [] Up as tolerated   [x] Bedridden    [] Specify:    SAFETY  [] 24 hr. Caregiver   [] Side rails ? [x] Hospital bed   [] Reviewed Falls & Safety       ALLERGIES AND MEDICATIONS     Allergies:    Allergies   Allergen Reactions    Cephalexin Rash    Pcn [Penicillins] Rash       Current Facility-Administered Medications   Medication Dose Route Frequency    LORazepam (ATIVAN) injection 1 mg  1 mg IntraVENous Q15MIN PRN    ketorolac (TORADOL) injection 30 mg  30 mg IntraVENous Q8H PRN    glycopyrrolate (ROBINUL) injection 0.2 mg  0.2 mg IntraVENous Q4H PRN    bisacodyl (DULCOLAX) suppository 10 mg  10 mg Rectal DAILY PRN    morphine injection 2 mg  2 mg IntraVENous Q15MIN PRN    morphine (PF)  mg/30 ml   IntraVENous CONTINUOUS    acetaminophen (TYLENOL) suppository 650 mg  650 mg Rectal Q4H PRN    saline peripheral flush soln 5 mL  5 mL InterCATHeter PRN          Visit Time In: 1030  Visit Time Out: 7648

## 2017-03-23 NOTE — HOSPICE
This MSW visited the room of patient. She remains unresponsive, no family was present. MSW offered a prayer of comfort and support. 16:30 MSW visited the room of patient, Jordi Payton and Miri Rocha were at bedside. MSW provided supportive counseling, discussed coping and coping of teen son and son at [place]. MSW provided active listening and emotional support. Son Jordi Payton is at peace and is accepting of patient's terminality.

## 2017-03-23 NOTE — PROGRESS NOTES
Spiritual Care Assessment/Progress Notes    Jono Loredo [id number]  [id number]    1935  81 y.o.  female    Patient Telephone Number: [phone] (home)   Mandaeism Affiliation: [facility]   Language: English   Extended Emergency Contact Information  Primary Emergency Contact: Kyle Weber Phone: [phone]  Relation: Child   Patient Active Problem List    Diagnosis Date Noted    Leukemia ([facility] [address] 03/22/2017    Thrombocytopenia ([facility] [address] 03/20/2017    Sinus tachycardia 03/20/2017    Sepsis ([facility] [facility] 03/17/2017    Anemia 11/11/2016    Neutropenia ([facility] [address] [facility] 08/25/2016    Leg DVT (deep venous thromboembolism), acute ([facility] [address] [facility] 08/25/2016    Neutropenic fever ([facility] [address] [facility] 03/30/2016    AML (acute myeloblastic leukemia) ([facility]     HTN (hypertension)     Thyroid activity decreased         Date: 3/23/2017       Level of Mandaeism/Spiritual Activity Per previous notes:  []         Involved in hudson tradition/spiritual practice    []         Not involved in hudson tradition/spiritual practice  [x]         Spiritually oriented    []         Claims no spiritual orientation    []         seeking spiritual identity  []         Feels alienated from Rastafarian practice/tradition  []         Feels angry about Rastafarian practice/tradition  []         Spirituality/Rastafarian tradition  a resource for coping at this time.   []         Not able to assess due to medical condition    Services Provided Today:  []         crisis intervention    []         reading Scriptures  [x]         spiritual assessment    []         prayer  [x]         empathic listening/emotional support  []         rites and rituals (cite in comments)  []         life review     []         Rastafarian support  []         theological development   []         advocacy  []         ethical dialog     []         blessing  []         bereavement support    []         support to family  []         anticipatory grief support   []         help with AMD  []         spiritual guidance    []         meditation      Spiritual Care Needs  []         Emotional Support  []         Spiritual/Samaritan Care  []         Loss/Adjustment  []         Advocacy/Referral                /Ethics  []         No needs expressed at               this time  [x]         Other: (note in               comments)  [address]  []         Follow up visits with               pt/family  []         Provide materials  []         Schedule sacraments  []         Contact Community               Clergy  [x]         Follow up as needed  []         Other: (note in               comments)     Comments: Request from Chaplains for a follow up visit as Miss Wetzel transitioned into hospice. No family was present. Previous  notes indicate a Jewish belief. Miss Wetzel did not respond to  presence. Provided comforting words and assurance of prayer. Left Pastoral Care card informing Miss Wetzel and family of my visit. Music Therapist came in and continued compassionate care with Miss Wetzel. Chaplains available as needed.   Visited by: Hoa Jordan [address] [facility] Charleen Leary ([id number])

## 2017-03-24 NOTE — PROGRESS NOTES
0630- Patient passed away  0640-family notified, will be coming to see patient  5- supervisor notified, hospitalist notified, hospice called  2869-  notified  0917- [facility] called, body released

## 2017-03-24 NOTE — HOSPICE
[facility] Help to Those in Need  [phone]    Discharge/Death Nursing Note   Patient Name: Colonel John  YOB: 1935  Age: 81 y.o. Date of Death: 17  Admitted Date: 3/22/2017  Time of Death: [place] of Care: [facility]  Level of Care: [facility]  Patient Room: [facility]/     Hospice Attending: Dr. Phyllis Coleman Diagnosis: leukemia and sepsis  Leukemia [facility])    Death Pronouncement   Pronouncement of death completed by:  Dr. Irina Escobar staff was not present at the time of death    At the time of death the patient was documented as pulsless, not breathing. The pt  within [facility]. The following were notified of the patient's death: Cesar Diana, son. Medications were disposed of per facility protocol     Discharge Summary   Discharge Reason: Death    Summary of Care Provided:    [x] Post mortem care provided by hospital staff. [x] Notification of  home by hospital staff.   [x] Referrals/Community resources provided: by Cindy Tavares, MSW  [x] Goals completed  [] Durable Medical Equipment vendor notified     Disciplines involved: [x] RN [x] SW [x]  [] VARGAS [] Vol [] PT [] OT [] ST [] [facility]    [x] IDT communication/notification    Attending Physician, Dr. Prabhakar Nova, notified of death    Bereaved   Verify bereaved identified with name, address, telephone number and risk level      Advance Care Planning 3/23/2017   Patient's Healthcare Decision Maker is: Legal Next of Ileana 70   Primary Decision Maker Name Cesar Diana   Primary Decision Maker Phone Number [phone]   Primary Decision Maker Relationship to Patient Adult child   Confirm Advance Directive Yes, on file

## 2017-03-24 NOTE — HOSPICE
Met with son as he was leaving the hospital. Son was grieving appropriately. Talked about how he was able to spend quality time with is mother yesterday. Son was at peace, he felt she was well cared for at [facility] [place] and was appreciative of hospital and hospice staff.

## 2017-03-24 NOTE — PROGRESS NOTES
Spiritual Care Assessment/Progress Notes    Mary Anne Grief [id number]  [id number]    1935  81 y.o.  female    Patient Telephone Number: [phone] (home)   Rastafarian Affiliation: Jessi   Language: English   Extended Emergency Contact Information  Primary Emergency Contact: Kyle Weber Phone: [phone]  Relation: Child   Patient Active Problem List    Diagnosis Date Noted    Atrial fibrillation, rapid ([address] 03/23/2017    Acute renal failure (ARF) ([address] 03/23/2017    Leukemia ([address] 03/22/2017    Thrombocytopenia ([address] 03/20/2017    Sinus tachycardia 03/20/2017    Sepsis ([address] 03/17/2017    Anemia 11/11/2016    Neutropenia ([address] 08/25/2016    Leg DVT (deep venous thromboembolism), acute ([address] 08/25/2016    Neutropenic fever ([address] 03/30/2016    AML (acute myeloblastic leukemia) ([address]     HTN (hypertension)     Thyroid activity decreased         Date: 3/24/2017       Level of Rastafarian/Spiritual Activity:  []         Involved in hudson tradition/spiritual practice    []         Not involved in hudson tradition/spiritual practice  []         Spiritually oriented    []         Claims no spiritual orientation    []         seeking spiritual identity  []         Feels alienated from Pentecostal practice/tradition  []         Feels angry about Pentecostal practice/tradition  []         Spirituality/Pentecostal tradition a resource for coping at this time.   [x]         Not able to assess due to medical condition    Services Provided Today:  []         crisis intervention    []         reading Scriptures  []         spiritual assessment    []         prayer  []         empathic listening/emotional support  []         rites and rituals (cite in comments)  []         life review     []         Pentecostal support  []         theological development   []         advocacy  []         ethical dialog     []         blessing  []         bereavement support    []         support to family  []         anticipatory grief support   []         help with AMD  []         spiritual guidance    []         meditation      Spiritual Care Needs  []         Emotional Support  []         Spiritual/Faith Care  []         Loss/Adjustment  []         Advocacy/Referral                /Ethics  []         No needs expressed at               this time  []         Other: (note in               comments)  [address]  []         Follow up visits with               pt/family  []         Provide materials  []         Schedule sacraments  []         Contact Community               Clergy  []         Follow up as needed  [x]         Other: (note in               comments)     Comments: [id number] Daniel Yao was paged after Ms. Wetzel had . Family had not yet arrived, and I was informed that they should be arriving around 7:45 am. I spoke with Ms. Wetzel's nurse, who will page us again when family arrives. Rev. Alexis Thomas M.Div, [facility]

## 2017-03-24 NOTE — PROGRESS NOTES
Bedside shift change report given to [facility] (oncoming nurse) by Annabella Powell (offgoing nurse). Report included the following information Recent Results. Patient is , TOD 12. Awaiting family. 8509 Family has arrived, notified . 8474 Patient's family has left, will prepare patient for transport to [facility]. Patient's family is using [employer] home on [address]. 1016 IV's and tran removed.

## 2017-03-24 NOTE — PROGRESS NOTES
Received page from [address] that patient's family had arrived. Consulted and was told that son was present in room. Proceeded to provided support leading son in processing. Son shared openly about his grief and how last final steps leading towards comfort care impacted both he and his brother. Provided empathic listening as son shared patient's health and social history. Both sons well supported by their wives, expressed concern about patient's partner in the senior living home where patient lived. Family plans to extend support fully to partner. Patient identified as [facility] but not connected to any Scientologist. No specific spiritual needs identified. Presented bereavement brochure. Son thankful for support. HEMALATHA Serna

## 2017-03-24 NOTE — PROGRESS NOTES
NUTRITION    RD PLAN OF CARE:     Best practice alert received. Chart reviewed. Pt is admitted with leukemia and sepsis  Leukemia ([facility] [address] Current Diet: NPO  Pt is currently being cared for by Hospice services. Aggressive nutrition intervention is not indicated at this time. Will follow and assist as needed.     Lili Taylor RD

## 2017-03-24 NOTE — PROGRESS NOTES
Pt  at 6:30 this am.  She has no response to sternal rub. No heart sounds or breath sounds. Pupils fixed & dilated. Family aware.

## 2017-03-24 NOTE — PROGRESS NOTES
Bedside and Verbal shift change report given to [address] [facility] (oncoming nurse) by Ibeth Thomas (offgoing nurse). Report included the following information SBAR, Kardex, MAR, Accordion and Recent Results.

## 2017-03-27 NOTE — DISCHARGE SUMMARY
Physician Discharge Summary     Patient ID:  Mia Li  [id number]  42 y.o.  1935    Admit date: 3/22/2017    Discharge date and time: 3/27/2017    Briefly, pt was admitted with leukemia and sepsis;Leukemia ([facility] [address] For details of admission, see H&P. Hospital Course:  Pt was transferred to hospice due to sepsis/pancytopenia associated with leukemia. She  peacefully. Discharge Dx: same    Condition at discharge: death    Disposition: death    Patient Instructions:   Cannot display discharge medications since this patient is not currently admitted.           Signed:  Brian Hendrickson MD  3/27/2017  8:24 AM

## 2017-04-17 NOTE — DISCHARGE SUMMARY
Patient admitted for sepsis after treatment for leukemia with low-dose deneen-C. We tried aggressive supportive care with IV fluids and antibiotics. At her age, she was not a candidate for ICU care and she had long ago decided against that. After several days of aggressive supportive care and no improvement and much misery on her part, she was transferred to hospice. Chief Complaint - follow-up and management of  AML   Exam  Gen Moderate distress; lethargic;responds some to voice and touch   HEENT No mucositis; no thrush   Nodes No supraclavicular or cervical adenopathy   Chest / line sites No inflammation   Lungs Clear to auscultation   CV RRR   Abd Non-tender   Ext No edema   Skin No rashes   Neuro  lethargic; weak   Other    Time-based care: [] 25-35 min    [x] > 35 mi     (Total time with >50% spent counseling/coordination)  Discussed with the following:  []     []  Nursing Staff  [x] Consultant    [x]  Family   []  Other:  Active Medical Problems  Acute myelogenous leukemia, 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 125 mcg per day. She could use a little fluid, but she's hypoxic and has pneumonia - need to worry about pulmonary edema - with afib and RVR also.   Will give blood and platelets and she'll get some volume from that   Will try to switch from G-CSF to GM-CSF  Febrile again, despite broad-spectrum antibiotics  Continue supportive care;agree with low dose morphine drip for comfort  D/w  and son       CT abd/pelvis  IMPRESSION:     1. Dense right lower lobe airspace disease. 2. No nephrolithiasis or hydronephrosis. 3. Pelvic cyst/cysts as above. US abd  Gallbladder sludge with trace pericholecystic fluid. No biliary ductal  dilatation. No evidence of acute cholecystitis. Chest Xray  IMPRESSION:  Persistent right basilar airspace disease. Current medications, progress notes, vital signs, radiology, and labs reviewed. Disease Features  30% blasts in marrow at Dx. 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 MRSA. Fatigue  Pancytopenia due to treatment  Pancytopenia, due to disease  Erythematous nodules to both thighs, resolved with abx  Subcutaneous painful nodules in lower legs  red cell antibodies  DVT 2016, anticoagulation on hold     Other Medical Problems  Atrial fibrillation. Hearing loss. Hypothyroidism. Osteoarthritis. Reflux/Heartburn. Colonoscopy in . Cataract removal in . Tonsillectomy in 1939.    Post-menopause at 49. OCP's x 8 years; post-menopause hormones x 10 years. Rash and nausea from subcutaneous Vidaza      Interim History and Assessment  Admission 3/17/17  Admitted yesterday with feeling poorly She has her head down on the pillow and her temperature is 102 and she feels really weak and lousy. She does not have any localizing symptoms of mouth sores, abdominal pain, cough or chest pain, skin lesions, or urinary infection symptoms. She's been under treatment for her AML with low-dose araC and the new agent venetoclax.   It's been about 2 weeks since the low-dose araC and she was due to start again next Monday but clearly we won't be doing that. Her absolute neutrophil count is 0 and her white cell count is 0.3. Admitted for V fluids and IV antibiotics  Elevated bili-abd US negative, bili better  abd pain- CT abd /pelvis showed some pelvic cysts, consider GYN consult next week  BLD cx shows Gm + cocci in pairs and chains, will see what speciation shows, CXR shows possible pneumonia- Cont Vanc and aztreonam  HBG down- transfuse PRBC  PLTs down- transfuse PLTs  Hypokalemia-replete      Family History: Mother  at age 76; history of lung cancer (was a smoker). Father  at the age of 64; history of alcoholism, heart disease, and liver disease. 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 Dating; happy. ROS  The ROS is positive for: fatigue, fever, weakness  Pertinent negatives include: No pulmonary, abdominal, , or cutaneous symptoms; no mucosiits  Except as noted above or in the interim history, a 10 point ROS is negative    Vital Signs  Vitals on 3/17/2017 1:27:00 PM: Height=63.5in, Weight=146.8lb, Temp=102.7f, XVTJW=400, IHAN=21, NWOKKPOMZI=450, VKBRDXOSSQE=03, Pulse HG=56%    Exam  ECOG Scale 3: Capable of only limited self care, confined to bed or chair more than 50% of waking hours General moderate distress. Looks frail. Normal respiratory effort  HEENT Anicteric. Conjunctiva + lids without inflammation. Oral No mucositis, no thrush  Lungs Clear to auscultation. CV Regular rhythm and rate. .  Abd Soft and non-tender without masses. No HSM. Extremities No edema or tenderness. Skin No significant rash.  Good turgor  Neuro CN's II - XII grossly normal.  Speech NL  Cognition NL  Affect NL    Labs  3/13/17 WBC=.5 ANC=.1 HGB=7.8 MCV=89.3 Plat=10  16 INR=1.1  3/13/17 Sodium=136.5 Potassium=3.6 JZY=552 Glucose=104 Creat=0.73 AST=13.7 ALT=11.5 Total Bili=2.7 Alk Phos=89.9 Calcium=8.6 eGFR, -American=109.6 eGFR, Non-=81.3    Lab Results Table  Lab results for 3/17/2017   Result Value Units Range Comment   WBC 0.3 K/uL 3.4-10.8 L=   RBC 3.2 M/uL 3.8-5.3    HGB 9.1 g/dL 11.1-15.9    HCT 27.5 % 34-46. 6    MCV 86.2 fL 79-97    MCH 28.4 pg 26.6-33    MCHC 33.0 g/dL 31.5-35.7    Plat 13 K/uL 150-379    ANC 0.0 K/uL 1.4-7    Lymph# 0.3 K/uL 0.7-3.1    EOS# 0.0 K/uL 0-0.4    Lymph% 89.1 % 14-46    EOS% 0.2 % 0-5    RDW 14.6 % 12.3-15.4          Hematology/Oncology Summary    8/5/13 - B12 of 497, CORRINE - negative. 10/7/13 - TSH 0.095, Creat 0.7  10/28/13 - Bone marrow biopsy - AML with 30% blasts; normal chromosomes; negative FISH; negative DNA studies for FLT; negative studies for myelodysplastic markers. 5/12/14 - Bone marrow biopsy - Persistent AML, but with the percentage of blasts decreased down to 11-15%. 11/6/14 - Bone marrow biopsy - 15% blasts, normal cytogenetics, FISH negative for MDS changes or high risk genetic changes. 11/10/14 - Doppler of lower extremities - negative for a clot. 5/27/15 - Bone marrow biopsy - residual acute myeloid leukemia (~16% blasts); 25% cellularity; FISH negative for MDS associated changes; 46,XX normal female karyotype   6/1/15 - Duplex U/S of right lower extremity at [facility] - SVT involving the greater saphenous vein from the distal thigh to distal calf   8/11/15 - Peripheral blood flow cytometry - minor circulating myeloblast population detected (0.9% of sample)  11/9/15 - Peripheral blood flow cytometry - minor myeloblast population (0.5%)  02/01/2016 - Peripheral blood flow cytometry - 0.5% detected ( using CD34 and ) without atypia. 3/30/16 - CT Chest @ [facility] - No evidence for infection in the chest.  4/12/16 - Peripheral blood flow cytometry @ [facility] - Increased myeloblasts (16% of sample), consistent with residual acute myeloid leukemia  4/12/16 - Bone Marrow Bx @ [facility] - Recurrent/residual acute myeloid leukemia (with 20-25% blasts; see comment).  Compared to the previous bone marrow study there is a mild increase in blast burden (previous study showed 16% blasts). Chromosome analysis pending. 8/24/16 - Acute LLE DVT ( femoral, pop, peroneal v.)  8/25/16 - IVC filter placement at [address]  9/18/16- Valproic acid level 26 (6-22)  11/18/16- BmBx blasts 30% as at diagnosis 3 years ago    Other Physicians:  Devaughn Pimentel [phone]~SO;  Provider Name Contact Information   Physician Alexis Mir MD    Consulting Provider Devaughn Pimentel MD Fax: [phone], Work: [phone], Fax: [phone], Work: [phone]  Work: [place] [place], [address]   Dermatologist Otoniel Jung M.D.  Fax: [phone], Work: [phone]  Work: [facility], [address]   Dermatologist Anisa Barron MD Fax: [phone], Work: [phone]  Work: [address] [facility], [address]   Primary Care Provider Devaughn Pimentel MD Fax: [phone], Work: [phone], Fax: [phone], Work: [phone]  Work: [employer] [place]